# Patient Record
Sex: FEMALE | Race: WHITE | Employment: OTHER | URBAN - METROPOLITAN AREA
[De-identification: names, ages, dates, MRNs, and addresses within clinical notes are randomized per-mention and may not be internally consistent; named-entity substitution may affect disease eponyms.]

---

## 2023-04-20 PROBLEM — H04.123 DRY EYES, BILATERAL: Status: ACTIVE | Noted: 2023-04-20

## 2023-04-20 PROBLEM — B35.1 TINEA UNGUIUM: Status: ACTIVE | Noted: 2023-04-20

## 2023-04-20 PROBLEM — R68.84 PAIN IN LOWER JAW: Status: ACTIVE | Noted: 2023-04-20

## 2023-04-20 PROBLEM — M54.16 LUMBAR RADICULOPATHY: Status: ACTIVE | Noted: 2023-04-20

## 2023-04-20 PROBLEM — M54.32 SCIATICA OF LEFT SIDE: Status: ACTIVE | Noted: 2023-04-20

## 2023-04-20 PROBLEM — M25.561 RIGHT KNEE PAIN: Status: ACTIVE | Noted: 2023-04-20

## 2023-04-20 PROBLEM — E04.1 THYROID NODULE: Status: ACTIVE | Noted: 2023-04-20

## 2023-04-20 PROBLEM — J44.9 CHRONIC OBSTRUCTIVE PULMONARY DISEASE (MULTI): Status: ACTIVE | Noted: 2023-04-20

## 2023-04-20 PROBLEM — G47.33 OBSTRUCTIVE SLEEP APNEA: Status: ACTIVE | Noted: 2023-04-20

## 2023-04-20 PROBLEM — J38.7 VALLECULAR CYST: Status: ACTIVE | Noted: 2023-04-20

## 2023-04-20 PROBLEM — K11.8 PAIN OF SUBMANDIBULAR GLAND: Status: ACTIVE | Noted: 2023-04-20

## 2023-04-20 PROBLEM — F32.A MILD DEPRESSION: Status: ACTIVE | Noted: 2023-04-20

## 2023-04-20 PROBLEM — R26.9 GAIT DISTURBANCE: Status: ACTIVE | Noted: 2023-04-20

## 2023-04-20 PROBLEM — R09.82 POST-NASAL DRAINAGE: Status: ACTIVE | Noted: 2023-04-20

## 2023-04-20 PROBLEM — R09.A2 GLOBUS SENSATION: Status: ACTIVE | Noted: 2023-04-20

## 2023-04-20 PROBLEM — F40.240 CLAUSTROPHOBIA: Status: ACTIVE | Noted: 2023-04-20

## 2023-04-20 PROBLEM — H25.813 COMBINED FORM OF AGE-RELATED CATARACT, BOTH EYES: Status: ACTIVE | Noted: 2023-04-20

## 2023-04-20 PROBLEM — N18.31 CHRONIC KIDNEY DISEASE, STAGE 3A (MULTI): Status: ACTIVE | Noted: 2023-04-20

## 2023-04-20 PROBLEM — E55.9 VITAMIN D DEFICIENCY: Status: ACTIVE | Noted: 2023-04-20

## 2023-04-20 PROBLEM — K52.9 CHRONIC DIARRHEA: Status: ACTIVE | Noted: 2023-04-20

## 2023-04-20 PROBLEM — E78.5 HYPERLIPIDEMIA: Status: ACTIVE | Noted: 2023-04-20

## 2023-04-20 PROBLEM — E78.2 MIXED HYPERLIPIDEMIA: Status: ACTIVE | Noted: 2023-04-20

## 2023-04-20 PROBLEM — H52.00 HYPEROPIA: Status: ACTIVE | Noted: 2023-04-20

## 2023-04-20 PROBLEM — Z96.1 PSEUDOPHAKIA, LEFT EYE: Status: ACTIVE | Noted: 2023-04-20

## 2023-04-20 PROBLEM — L20.9 ATOPIC DERMATITIS: Status: ACTIVE | Noted: 2023-04-20

## 2023-04-20 PROBLEM — H52.209 ASTIGMATISM: Status: ACTIVE | Noted: 2023-04-20

## 2023-04-20 PROBLEM — L98.9 SKIN LESION: Status: ACTIVE | Noted: 2023-04-20

## 2023-04-20 PROBLEM — L71.9 ROSACEA: Status: ACTIVE | Noted: 2023-04-20

## 2023-04-20 PROBLEM — M54.50 LOW BACK PAIN: Status: ACTIVE | Noted: 2023-04-20

## 2023-04-20 PROBLEM — S01.81XA FACIAL LACERATION: Status: ACTIVE | Noted: 2023-04-20

## 2023-04-20 PROBLEM — Z96.1 PSEUDOPHAKIA OF RIGHT EYE: Status: ACTIVE | Noted: 2023-04-20

## 2023-04-20 PROBLEM — G50.0 TRIGEMINAL NEURALGIA OF RIGHT SIDE OF FACE: Status: ACTIVE | Noted: 2023-04-20

## 2023-04-20 PROBLEM — I10 BENIGN ESSENTIAL HYPERTENSION: Status: ACTIVE | Noted: 2023-04-20

## 2023-04-20 PROBLEM — M25.473 ANKLE EDEMA: Status: ACTIVE | Noted: 2023-04-20

## 2023-04-20 PROBLEM — E03.9 ACQUIRED HYPOTHYROIDISM: Status: ACTIVE | Noted: 2023-04-20

## 2023-04-20 PROBLEM — K13.79 MOUTH PAIN: Status: ACTIVE | Noted: 2023-04-20

## 2023-04-20 RX ORDER — FUROSEMIDE 20 MG/1
20 TABLET ORAL DAILY
COMMUNITY
Start: 2019-09-09 | End: 2023-04-21 | Stop reason: SDUPTHER

## 2023-04-20 RX ORDER — ROSUVASTATIN CALCIUM 10 MG/1
10 TABLET, COATED ORAL DAILY
COMMUNITY
Start: 2018-09-28 | End: 2023-04-21 | Stop reason: SDUPTHER

## 2023-04-20 RX ORDER — NAPROXEN 500 MG/1
500 TABLET ORAL DAILY PRN
COMMUNITY
Start: 2019-10-14 | End: 2023-04-21 | Stop reason: SDUPTHER

## 2023-04-20 RX ORDER — ESCITALOPRAM OXALATE 5 MG/1
5 TABLET ORAL DAILY
COMMUNITY
Start: 2022-03-14 | End: 2023-04-21 | Stop reason: SDUPTHER

## 2023-04-20 RX ORDER — BACLOFEN 10 MG/1
10 TABLET ORAL 2 TIMES DAILY
COMMUNITY
End: 2023-04-21 | Stop reason: ALTCHOICE

## 2023-04-20 RX ORDER — CALCIUM CARBONATE 200(500)MG
2 TABLET,CHEWABLE ORAL 4 TIMES DAILY PRN
COMMUNITY

## 2023-04-20 RX ORDER — MAGNESIUM 250 MG
TABLET ORAL DAILY
COMMUNITY
End: 2023-10-20 | Stop reason: ALTCHOICE

## 2023-04-20 RX ORDER — CIDER VINEGAR 300 MG
TABLET ORAL DAILY
COMMUNITY
End: 2023-07-21 | Stop reason: ALTCHOICE

## 2023-04-20 RX ORDER — GABAPENTIN 100 MG/1
100 CAPSULE ORAL 3 TIMES DAILY
COMMUNITY
Start: 2020-07-22 | End: 2023-04-21 | Stop reason: SDUPTHER

## 2023-04-20 RX ORDER — BIOTIN 10 MG
TABLET ORAL DAILY
COMMUNITY

## 2023-04-20 RX ORDER — CRISABOROLE 20 MG/G
OINTMENT TOPICAL DAILY
COMMUNITY
Start: 2019-09-09 | End: 2024-04-19 | Stop reason: SDUPTHER

## 2023-04-20 RX ORDER — ATENOLOL 50 MG/1
50 TABLET ORAL DAILY
COMMUNITY
Start: 2018-09-10 | End: 2023-04-21 | Stop reason: SDUPTHER

## 2023-04-20 RX ORDER — NAPROXEN SODIUM 220 MG
220 TABLET ORAL EVERY 12 HOURS PRN
COMMUNITY
Start: 2018-09-10 | End: 2023-04-21 | Stop reason: SDUPTHER

## 2023-04-21 ENCOUNTER — LAB (OUTPATIENT)
Dept: LAB | Facility: LAB | Age: 83
End: 2023-04-21
Payer: COMMERCIAL

## 2023-04-21 ENCOUNTER — OFFICE VISIT (OUTPATIENT)
Dept: PRIMARY CARE | Facility: CLINIC | Age: 83
End: 2023-04-21
Payer: COMMERCIAL

## 2023-04-21 VITALS
DIASTOLIC BLOOD PRESSURE: 77 MMHG | WEIGHT: 174 LBS | SYSTOLIC BLOOD PRESSURE: 135 MMHG | BODY MASS INDEX: 37 KG/M2 | HEART RATE: 67 BPM | TEMPERATURE: 97.3 F

## 2023-04-21 DIAGNOSIS — Z79.899 MEDICATION MANAGEMENT: Primary | ICD-10-CM

## 2023-04-21 DIAGNOSIS — M54.16 LUMBAR RADICULOPATHY: ICD-10-CM

## 2023-04-21 DIAGNOSIS — I10 BENIGN ESSENTIAL HYPERTENSION: ICD-10-CM

## 2023-04-21 DIAGNOSIS — Z00.00 ROUTINE GENERAL MEDICAL EXAMINATION AT HEALTH CARE FACILITY: ICD-10-CM

## 2023-04-21 DIAGNOSIS — E78.5 HYPERLIPIDEMIA, UNSPECIFIED HYPERLIPIDEMIA TYPE: ICD-10-CM

## 2023-04-21 DIAGNOSIS — R73.9 HYPERGLYCEMIA: ICD-10-CM

## 2023-04-21 DIAGNOSIS — F32.A MILD DEPRESSION: ICD-10-CM

## 2023-04-21 DIAGNOSIS — Z00.00 MEDICARE ANNUAL WELLNESS VISIT, SUBSEQUENT: ICD-10-CM

## 2023-04-21 LAB
ALANINE AMINOTRANSFERASE (SGPT) (U/L) IN SER/PLAS: 16 U/L (ref 7–45)
ALBUMIN (G/DL) IN SER/PLAS: 4.2 G/DL (ref 3.4–5)
ALKALINE PHOSPHATASE (U/L) IN SER/PLAS: 55 U/L (ref 33–136)
ANION GAP IN SER/PLAS: 13 MMOL/L (ref 10–20)
ASPARTATE AMINOTRANSFERASE (SGOT) (U/L) IN SER/PLAS: 17 U/L (ref 9–39)
BASOPHILS (10*3/UL) IN BLOOD BY AUTOMATED COUNT: 0.07 X10E9/L (ref 0–0.1)
BASOPHILS/100 LEUKOCYTES IN BLOOD BY AUTOMATED COUNT: 0.9 % (ref 0–2)
BILIRUBIN TOTAL (MG/DL) IN SER/PLAS: 0.7 MG/DL (ref 0–1.2)
CALCIUM (MG/DL) IN SER/PLAS: 10.1 MG/DL (ref 8.6–10.6)
CARBON DIOXIDE, TOTAL (MMOL/L) IN SER/PLAS: 29 MMOL/L (ref 21–32)
CHLORIDE (MMOL/L) IN SER/PLAS: 106 MMOL/L (ref 98–107)
CHOLESTEROL (MG/DL) IN SER/PLAS: 197 MG/DL (ref 0–199)
CHOLESTEROL IN HDL (MG/DL) IN SER/PLAS: 60.1 MG/DL
CHOLESTEROL/HDL RATIO: 3.3
CREATININE (MG/DL) IN SER/PLAS: 1.03 MG/DL (ref 0.5–1.05)
EOSINOPHILS (10*3/UL) IN BLOOD BY AUTOMATED COUNT: 0.1 X10E9/L (ref 0–0.4)
EOSINOPHILS/100 LEUKOCYTES IN BLOOD BY AUTOMATED COUNT: 1.4 % (ref 0–6)
ERYTHROCYTE DISTRIBUTION WIDTH (RATIO) BY AUTOMATED COUNT: 12.8 % (ref 11.5–14.5)
ERYTHROCYTE MEAN CORPUSCULAR HEMOGLOBIN CONCENTRATION (G/DL) BY AUTOMATED: 32.3 G/DL (ref 32–36)
ERYTHROCYTE MEAN CORPUSCULAR VOLUME (FL) BY AUTOMATED COUNT: 89 FL (ref 80–100)
ERYTHROCYTES (10*6/UL) IN BLOOD BY AUTOMATED COUNT: 4.55 X10E12/L (ref 4–5.2)
ESTIMATED AVERAGE GLUCOSE FOR HBA1C: 108 MG/DL
GFR FEMALE: 54 ML/MIN/1.73M2
GLUCOSE (MG/DL) IN SER/PLAS: 88 MG/DL (ref 74–99)
HEMATOCRIT (%) IN BLOOD BY AUTOMATED COUNT: 40.3 % (ref 36–46)
HEMOGLOBIN (G/DL) IN BLOOD: 13 G/DL (ref 12–16)
HEMOGLOBIN A1C/HEMOGLOBIN TOTAL IN BLOOD: 5.4 %
IMMATURE GRANULOCYTES/100 LEUKOCYTES IN BLOOD BY AUTOMATED COUNT: 0.3 % (ref 0–0.9)
LDL: 114 MG/DL (ref 0–99)
LEUKOCYTES (10*3/UL) IN BLOOD BY AUTOMATED COUNT: 7.4 X10E9/L (ref 4.4–11.3)
LYMPHOCYTES (10*3/UL) IN BLOOD BY AUTOMATED COUNT: 2.7 X10E9/L (ref 0.8–3)
LYMPHOCYTES/100 LEUKOCYTES IN BLOOD BY AUTOMATED COUNT: 36.6 % (ref 13–44)
MONOCYTES (10*3/UL) IN BLOOD BY AUTOMATED COUNT: 0.88 X10E9/L (ref 0.05–0.8)
MONOCYTES/100 LEUKOCYTES IN BLOOD BY AUTOMATED COUNT: 11.9 % (ref 2–10)
NEUTROPHILS (10*3/UL) IN BLOOD BY AUTOMATED COUNT: 3.6 X10E9/L (ref 1.6–5.5)
NEUTROPHILS/100 LEUKOCYTES IN BLOOD BY AUTOMATED COUNT: 48.9 % (ref 40–80)
NRBC (PER 100 WBCS) BY AUTOMATED COUNT: 0 /100 WBC (ref 0–0)
PLATELETS (10*3/UL) IN BLOOD AUTOMATED COUNT: 282 X10E9/L (ref 150–450)
POTASSIUM (MMOL/L) IN SER/PLAS: 4.5 MMOL/L (ref 3.5–5.3)
PROTEIN TOTAL: 6.9 G/DL (ref 6.4–8.2)
SODIUM (MMOL/L) IN SER/PLAS: 143 MMOL/L (ref 136–145)
TRIGLYCERIDE (MG/DL) IN SER/PLAS: 117 MG/DL (ref 0–149)
UREA NITROGEN (MG/DL) IN SER/PLAS: 22 MG/DL (ref 6–23)
VLDL: 23 MG/DL (ref 0–40)

## 2023-04-21 PROCEDURE — G0009 ADMIN PNEUMOCOCCAL VACCINE: HCPCS | Performed by: FAMILY MEDICINE

## 2023-04-21 PROCEDURE — 36415 COLL VENOUS BLD VENIPUNCTURE: CPT

## 2023-04-21 PROCEDURE — 1157F ADVNC CARE PLAN IN RCRD: CPT | Performed by: FAMILY MEDICINE

## 2023-04-21 PROCEDURE — 1170F FXNL STATUS ASSESSED: CPT | Performed by: FAMILY MEDICINE

## 2023-04-21 PROCEDURE — 80355 GABAPENTIN NON-BLOOD: CPT

## 2023-04-21 PROCEDURE — 85025 COMPLETE CBC W/AUTO DIFF WBC: CPT

## 2023-04-21 PROCEDURE — 90677 PCV20 VACCINE IM: CPT | Performed by: FAMILY MEDICINE

## 2023-04-21 PROCEDURE — 1036F TOBACCO NON-USER: CPT | Performed by: FAMILY MEDICINE

## 2023-04-21 PROCEDURE — 99214 OFFICE O/P EST MOD 30 MIN: CPT | Performed by: FAMILY MEDICINE

## 2023-04-21 PROCEDURE — 1126F AMNT PAIN NOTED NONE PRSNT: CPT | Performed by: FAMILY MEDICINE

## 2023-04-21 PROCEDURE — 3078F DIAST BP <80 MM HG: CPT | Performed by: FAMILY MEDICINE

## 2023-04-21 PROCEDURE — 80053 COMPREHEN METABOLIC PANEL: CPT

## 2023-04-21 PROCEDURE — 3075F SYST BP GE 130 - 139MM HG: CPT | Performed by: FAMILY MEDICINE

## 2023-04-21 PROCEDURE — 80061 LIPID PANEL: CPT

## 2023-04-21 PROCEDURE — G0439 PPPS, SUBSEQ VISIT: HCPCS | Performed by: FAMILY MEDICINE

## 2023-04-21 PROCEDURE — 1159F MED LIST DOCD IN RCRD: CPT | Performed by: FAMILY MEDICINE

## 2023-04-21 PROCEDURE — 83036 HEMOGLOBIN GLYCOSYLATED A1C: CPT

## 2023-04-21 PROCEDURE — 80307 DRUG TEST PRSMV CHEM ANLYZR: CPT

## 2023-04-21 PROCEDURE — 1160F RVW MEDS BY RX/DR IN RCRD: CPT | Performed by: FAMILY MEDICINE

## 2023-04-21 RX ORDER — ROSUVASTATIN CALCIUM 10 MG/1
10 TABLET, COATED ORAL DAILY
Qty: 90 TABLET | Refills: 0 | Status: SHIPPED | OUTPATIENT
Start: 2023-04-21 | End: 2023-07-21 | Stop reason: SDUPTHER

## 2023-04-21 RX ORDER — FUROSEMIDE 20 MG/1
20 TABLET ORAL DAILY
Qty: 90 TABLET | Refills: 0 | Status: SHIPPED | OUTPATIENT
Start: 2023-04-21 | End: 2023-07-21 | Stop reason: SDUPTHER

## 2023-04-21 RX ORDER — ESCITALOPRAM OXALATE 5 MG/1
5 TABLET ORAL DAILY
Qty: 90 TABLET | Refills: 0 | Status: SHIPPED | OUTPATIENT
Start: 2023-04-21 | End: 2023-07-21 | Stop reason: SDUPTHER

## 2023-04-21 RX ORDER — GABAPENTIN 100 MG/1
100 CAPSULE ORAL 3 TIMES DAILY
Qty: 90 CAPSULE | Refills: 2 | Status: SHIPPED | OUTPATIENT
Start: 2023-04-21 | End: 2023-07-21 | Stop reason: SDUPTHER

## 2023-04-21 RX ORDER — ATENOLOL 50 MG/1
50 TABLET ORAL DAILY
Qty: 90 TABLET | Refills: 0 | Status: SHIPPED | OUTPATIENT
Start: 2023-04-21 | End: 2023-07-21 | Stop reason: SDUPTHER

## 2023-04-21 RX ORDER — NAPROXEN 500 MG/1
500 TABLET ORAL DAILY PRN
Qty: 90 TABLET | Refills: 0 | Status: SHIPPED | OUTPATIENT
Start: 2023-04-21 | End: 2023-07-21 | Stop reason: SDUPTHER

## 2023-04-21 ASSESSMENT — LIFESTYLE VARIABLES
HOW MANY STANDARD DRINKS CONTAINING ALCOHOL DO YOU HAVE ON A TYPICAL DAY: 1 OR 2
AUDIT-C TOTAL SCORE: 5
HOW OFTEN DO YOU HAVE A DRINK CONTAINING ALCOHOL: 2-4 TIMES A MONTH
HOW OFTEN DO YOU HAVE SIX OR MORE DRINKS ON ONE OCCASION: WEEKLY
SKIP TO QUESTIONS 9-10: 0

## 2023-04-21 ASSESSMENT — ENCOUNTER SYMPTOMS
LOSS OF SENSATION IN FEET: 0
OCCASIONAL FEELINGS OF UNSTEADINESS: 0
DEPRESSION: 0

## 2023-04-21 ASSESSMENT — ACTIVITIES OF DAILY LIVING (ADL)
GROCERY_SHOPPING: INDEPENDENT
BATHING: INDEPENDENT
TAKING_MEDICATION: INDEPENDENT
DRESSING: INDEPENDENT
MANAGING_FINANCES: INDEPENDENT
DOING_HOUSEWORK: INDEPENDENT

## 2023-04-21 ASSESSMENT — PATIENT HEALTH QUESTIONNAIRE - PHQ9
2. FEELING DOWN, DEPRESSED OR HOPELESS: NOT AT ALL
1. LITTLE INTEREST OR PLEASURE IN DOING THINGS: NOT AT ALL
SUM OF ALL RESPONSES TO PHQ9 QUESTIONS 1 AND 2: 0

## 2023-04-21 NOTE — PROGRESS NOTES
Subjective   Reason for Visit: Thania Christian is an 83 y.o. female here for a Medicare Wellness visit.     Past Medical, Surgical, and Family History reviewed and updated in chart.    Reviewed all medications by prescribing practitioner or clinical pharmacist (such as prescriptions, OTCs, herbal therapies and supplements) and documented in the medical record.  Medicare Wellness Billing Compliance Satisfied    Review all medications by prescribing practitioner or clinical pharmacist (such as prescriptions, OTCs, herbal therapies and supplements) documented in the medical record    Past Medical, Surgical, and Family History reviewed and updated in chart    Tobacco Use Reviewed    Alcohol Use Reviewed    Illicit Drug Use Reviewed    PHQ2/9    Falls in Last Year Reviewed    Home Safety Risk Factors Reviewed    Cognitive Impairment Reviewed    Patient Self Assessment and Health Status    Current Diet Reviewed    Exercise Frequency    ADL - Hearing Impairment    ADL - Bathing    ADL - Dressing    ADL - Walks in Home    IADL - Managing Finances    IADL - Grocery Shopping    IADL - Taking Medications    IADL - Doing Housework      HPI    Patient Care Team:  Miladys Mcleod DO as PCP - General  Miladys Mcleod DO as PCP - [employer] Medicare Advantage PCP  Miladys Mcleod DO as PCP - [employer] Advantage PCP     Review of Systems    Objective   Vitals:  /77   Pulse 67   Temp 36.3 °C (97.3 °F)   Wt 78.9 kg (174 lb)   BMI 37.00 kg/m²       Physical Exam  Constitutional:       Appearance: Normal appearance.   Cardiovascular:      Rate and Rhythm: Normal rate and regular rhythm.      Pulses: Normal pulses.      Heart sounds: Normal heart sounds.   Pulmonary:      Effort: Pulmonary effort is normal.      Breath sounds: Normal breath sounds.   Abdominal:      General: Bowel sounds are normal.      Palpations: Abdomen is soft.   Musculoskeletal:         General: No  tenderness. Normal range of motion.      Cervical back: Normal range of motion and neck supple.   Skin:     General: Skin is warm and dry.   Neurological:      General: No focal deficit present.      Mental Status: She is alert and oriented to person, place, and time.   Psychiatric:         Mood and Affect: Mood normal.         Thought Content: Thought content normal.         Judgment: Judgment normal.         Assessment/Plan   Problem List Items Addressed This Visit          Nervous    Lumbar radiculopathy    Relevant Medications    gabapentin (Neurontin) 100 mg capsule    naproxen (Naprosyn) 500 mg tablet    Other Relevant Orders    Drug Screen, Urine With Reflex to Confirmation    Drug Screen, Urine With Reflex to Confirmation       Circulatory    Benign essential hypertension    Relevant Medications    rosuvastatin (Crestor) 10 mg tablet    furosemide (Lasix) 20 mg tablet    atenolol (Tenormin) 50 mg tablet    Other Relevant Orders    CBC and Auto Differential    Comprehensive Metabolic Panel       Other    Hyperlipidemia    Relevant Medications    rosuvastatin (Crestor) 10 mg tablet    Other Relevant Orders    Lipid Panel    Mild depression    Relevant Medications    escitalopram (Lexapro) 5 mg tablet     Other Visit Diagnoses       Medication management    -  Primary    Relevant Orders    Gabapentin,Urine    Drug Screen, Urine With Reflex to Confirmation    Drug Screen, Urine With Reflex to Confirmation    Drug Screen, Urine With Reflex to Confirmation    Hyperglycemia        Relevant Orders    Hemoglobin A1C    Routine general medical examination at health care facility

## 2023-04-21 NOTE — PATIENT INSTRUCTIONS
Health Maintenance  Provided order for annual labs with fasting. Will call with results.   Provided Prevnar 20 in office today.     Diarrhea  Advised to reduce magnesium from routine.    Hyperlipidemia  Continue on rosuvastatin 10 mg daily. Prescription sent to pharmacy.      Hypertension  Continue on Furosemide 20 mg and atenolol 50 mg daily. Prescription sent to pharmacy.     Mild Depression  Continue on Escitalopram 5 mg daily. Prescription sent to pharmacy.     Back Pain and Trigeminal neuralgia  Continue on Gabapentin 100 mg three times daily and Naproxen 220 mg daily as needed. Prescription sent to pharmacy.  OARRS checked. Risk and benefit ratio assessed. No Suspicious activity

## 2023-04-21 NOTE — PROGRESS NOTES
Medicare Wellness Billing Compliance Satisfied    Review all medications by prescribing practitioner or clinical pharmacist (such as prescriptions, OTCs, herbal therapies and supplements) documented in the medical record    Past Medical, Surgical, and Family History reviewed and updated in chart    Tobacco Use Reviewed    Alcohol Use Reviewed    Illicit Drug Use Reviewed    PHQ2/9    Falls in Last Year Reviewed    Home Safety Risk Factors Reviewed    Cognitive Impairment Reviewed    Patient Self Assessment and Health Status    Current Diet Reviewed    Exercise Frequency    ADL - Hearing Impairment    ADL - Bathing    ADL - Dressing    ADL - Walks in Home    IADL - Managing Finances    IADL - Grocery Shopping    IADL - Taking Medications    IADL - Doing Housework        HPI  She recently switched insurance providers.     She complains of diarrhea. She has been drinking more water. She takes magnesium and has been intermittently doing so for 4-5 years.     She continues on rosuvastatin, gabapentin, escitalopram, naproxen, atenolol and fureosemide, as they are controlling symptoms well. She does not voice any side effects.     Assessment and Plan  Health Maintenance  Provided order for annual labs with fasting. Will call with results.   Provided Prevnar 20 in office today.     Diarrhea  Advised to reduce magnesium from routine.    Hyperlipidemia  Continue on rosuvastatin 10 mg daily. Prescription sent to pharmacy.      Hypertension  Continue on Furosemide 20 mg and atenolol 50 mg daily. Prescription sent to pharmacy.     Mild Depression  Continue on Escitalopram 5 mg daily. Prescription sent to pharmacy.     Back Pain and Trigeminal neuralgia  Continue on Gabapentin 100 mg three times daily and Naproxen 220 mg daily as needed. Prescription sent to pharmacy.  OARRS checked. Risk and benefit ratio assessed. No Suspicious activity  Drug Screen and Contract completed today.    Scribe Attestation  By  signing my name below, I, Sarah Pruett Scribchata   attest that this documentation has been prepared under the direction and in the presence of Miladys Mcleod DO.

## 2023-04-23 LAB
AMPHETAMINE (PRESENCE) IN URINE BY SCREEN METHOD: NORMAL
BARBITURATES PRESENCE IN URINE BY SCREEN METHOD: NORMAL
BENZODIAZEPINE (PRESENCE) IN URINE BY SCREEN METHOD: NORMAL
CANNABINOIDS IN URINE BY SCREEN METHOD: NORMAL
COCAINE (PRESENCE) IN URINE BY SCREEN METHOD: NORMAL
DRUG SCREEN COMMENT URINE: NORMAL
FENTANYL URINE: NORMAL
METHADONE (PRESENCE) IN URINE BY SCREEN METHOD: NORMAL
OPIATES (PRESENCE) IN URINE BY SCREEN METHOD: NORMAL
OXYCODONE (PRESENCE) IN URINE BY SCREEN METHOD: NORMAL
PHENCYCLIDINE (PRESENCE) IN URINE BY SCREEN METHOD: NORMAL

## 2023-04-28 LAB — GABAPENTIN,URINE: 51.7 UG/ML

## 2023-05-03 NOTE — RESULT ENCOUNTER NOTE
Report to patient her blood count is normal.  Her chemistries are normal.  Her cholesterol is mildly elevated with LDL/bad cholesterol at 114 and ideally should be below 100.  Hemoglobin A1c is normal also.

## 2023-07-20 DIAGNOSIS — M54.16 LUMBAR RADICULOPATHY: ICD-10-CM

## 2023-07-20 DIAGNOSIS — E78.5 HYPERLIPIDEMIA, UNSPECIFIED HYPERLIPIDEMIA TYPE: ICD-10-CM

## 2023-07-20 DIAGNOSIS — I10 BENIGN ESSENTIAL HYPERTENSION: ICD-10-CM

## 2023-07-21 ENCOUNTER — OFFICE VISIT (OUTPATIENT)
Dept: PRIMARY CARE | Facility: CLINIC | Age: 83
End: 2023-07-21
Payer: COMMERCIAL

## 2023-07-21 VITALS
DIASTOLIC BLOOD PRESSURE: 81 MMHG | TEMPERATURE: 98 F | RESPIRATION RATE: 18 BRPM | BODY MASS INDEX: 35.88 KG/M2 | SYSTOLIC BLOOD PRESSURE: 148 MMHG | WEIGHT: 178 LBS | HEART RATE: 59 BPM | HEIGHT: 59 IN | OXYGEN SATURATION: 93 %

## 2023-07-21 DIAGNOSIS — E78.2 MIXED HYPERLIPIDEMIA: ICD-10-CM

## 2023-07-21 DIAGNOSIS — F32.A MILD DEPRESSION: ICD-10-CM

## 2023-07-21 DIAGNOSIS — M54.16 LUMBAR RADICULOPATHY: ICD-10-CM

## 2023-07-21 DIAGNOSIS — E83.42 HYPOMAGNESEMIA: ICD-10-CM

## 2023-07-21 DIAGNOSIS — J44.9 CHRONIC OBSTRUCTIVE PULMONARY DISEASE, UNSPECIFIED COPD TYPE (MULTI): ICD-10-CM

## 2023-07-21 DIAGNOSIS — R26.9 GAIT DISTURBANCE: ICD-10-CM

## 2023-07-21 DIAGNOSIS — N18.31 CHRONIC KIDNEY DISEASE, STAGE 3A (MULTI): ICD-10-CM

## 2023-07-21 DIAGNOSIS — R73.9 HYPERGLYCEMIA: ICD-10-CM

## 2023-07-21 DIAGNOSIS — I10 BENIGN ESSENTIAL HYPERTENSION: Primary | ICD-10-CM

## 2023-07-21 DIAGNOSIS — E66.01 OBESITY, MORBID (MULTI): ICD-10-CM

## 2023-07-21 DIAGNOSIS — E78.5 HYPERLIPIDEMIA, UNSPECIFIED HYPERLIPIDEMIA TYPE: ICD-10-CM

## 2023-07-21 DIAGNOSIS — E03.9 ACQUIRED HYPOTHYROIDISM: ICD-10-CM

## 2023-07-21 PROBLEM — R26.89 BALANCE PROBLEMS: Status: ACTIVE | Noted: 2023-07-21

## 2023-07-21 PROBLEM — M62.81 MUSCLE WEAKNESS: Status: ACTIVE | Noted: 2023-07-21

## 2023-07-21 PROCEDURE — 1126F AMNT PAIN NOTED NONE PRSNT: CPT | Performed by: FAMILY MEDICINE

## 2023-07-21 PROCEDURE — 3079F DIAST BP 80-89 MM HG: CPT | Performed by: FAMILY MEDICINE

## 2023-07-21 PROCEDURE — 1159F MED LIST DOCD IN RCRD: CPT | Performed by: FAMILY MEDICINE

## 2023-07-21 PROCEDURE — 1036F TOBACCO NON-USER: CPT | Performed by: FAMILY MEDICINE

## 2023-07-21 PROCEDURE — 99214 OFFICE O/P EST MOD 30 MIN: CPT | Performed by: FAMILY MEDICINE

## 2023-07-21 PROCEDURE — 3077F SYST BP >= 140 MM HG: CPT | Performed by: FAMILY MEDICINE

## 2023-07-21 PROCEDURE — 1160F RVW MEDS BY RX/DR IN RCRD: CPT | Performed by: FAMILY MEDICINE

## 2023-07-21 PROCEDURE — 1157F ADVNC CARE PLAN IN RCRD: CPT | Performed by: FAMILY MEDICINE

## 2023-07-21 RX ORDER — ATENOLOL 50 MG/1
50 TABLET ORAL 2 TIMES DAILY
Qty: 180 TABLET | Refills: 0 | Status: SHIPPED | OUTPATIENT
Start: 2023-07-21 | End: 2023-10-20 | Stop reason: SDUPTHER

## 2023-07-21 RX ORDER — NAPROXEN 500 MG/1
500 TABLET ORAL DAILY PRN
Qty: 90 TABLET | Refills: 0 | Status: SHIPPED | OUTPATIENT
Start: 2023-07-21 | End: 2023-10-20 | Stop reason: SDUPTHER

## 2023-07-21 RX ORDER — ROSUVASTATIN CALCIUM 10 MG/1
10 TABLET, COATED ORAL DAILY
Qty: 90 TABLET | Refills: 0 | Status: SHIPPED | OUTPATIENT
Start: 2023-07-21 | End: 2023-10-20 | Stop reason: SDUPTHER

## 2023-07-21 RX ORDER — LOSARTAN POTASSIUM 25 MG/1
25 TABLET ORAL DAILY
Qty: 90 TABLET | Refills: 0 | Status: SHIPPED | OUTPATIENT
Start: 2023-07-21 | End: 2023-10-20 | Stop reason: SDUPTHER

## 2023-07-21 RX ORDER — FUROSEMIDE 20 MG/1
20 TABLET ORAL DAILY
Qty: 90 TABLET | Refills: 0 | Status: SHIPPED | OUTPATIENT
Start: 2023-07-21 | End: 2023-10-20 | Stop reason: SDUPTHER

## 2023-07-21 RX ORDER — ESCITALOPRAM OXALATE 5 MG/1
5 TABLET ORAL DAILY
Qty: 90 TABLET | Refills: 0 | Status: SHIPPED | OUTPATIENT
Start: 2023-07-21 | End: 2023-10-20 | Stop reason: SDUPTHER

## 2023-07-21 RX ORDER — ATENOLOL 50 MG/1
50 TABLET ORAL DAILY
Qty: 90 TABLET | Refills: 0 | Status: SHIPPED | OUTPATIENT
Start: 2023-07-21 | End: 2023-07-21 | Stop reason: SDUPTHER

## 2023-07-21 RX ORDER — NAPROXEN SODIUM 220 MG
220 TABLET ORAL EVERY 12 HOURS PRN
COMMUNITY
Start: 2018-09-10 | End: 2023-07-21 | Stop reason: ALTCHOICE

## 2023-07-21 RX ORDER — GABAPENTIN 100 MG/1
100 CAPSULE ORAL 3 TIMES DAILY
Qty: 270 CAPSULE | Refills: 0 | Status: SHIPPED | OUTPATIENT
Start: 2023-07-21 | End: 2023-10-20 | Stop reason: SDUPTHER

## 2023-07-21 ASSESSMENT — PATIENT HEALTH QUESTIONNAIRE - PHQ9
1. LITTLE INTEREST OR PLEASURE IN DOING THINGS: NOT AT ALL
2. FEELING DOWN, DEPRESSED OR HOPELESS: NOT AT ALL
SUM OF ALL RESPONSES TO PHQ9 QUESTIONS 1 AND 2: 0

## 2023-07-21 ASSESSMENT — PAIN SCALES - GENERAL: PAINLEVEL: 0-NO PAIN

## 2023-07-21 NOTE — ASSESSMENT & PLAN NOTE
Pt has gained weight in new living condition.  Discussed diet and activity level, reassess 6 months

## 2023-07-21 NOTE — PROGRESS NOTES
"Subjective   Patient ID: Thania Christian is a 83 y.o. female who presents for 3 month f/up.    HPI   She continues on atenolol, escitalopram, furosemide, gabapentin, and rosuvastatin. She does not voice any side effects.     She takes Naproxen in the morning for relief. She at times takes an Aleve at night as needed but not daily     She reports that she has two disability placards that are up for renewal. She needs paperwork for renewal.     She inquired about her Magnesium level.  Discussed diarrhea last OV and advised Mg may cause diarrhea and is much better since stopped it.  Will check level w her next labs    She has noticed her blood pressure is higher today and attributes to weight gain. She notes that her weight has increased due to the food at her Senior Living facility. She feels fatigued, and needs to nap more often. She states she is going to start eating sweets only at [employer] on Sunday    Breathing has been good, no sob.  Has not been affected by wild fire smoke etc.  Review of Systems    Objective   /81   Pulse 59   Temp 36.7 °C (98 °F)   Resp 18   Ht 1.499 m (4' 11\")   Wt 80.7 kg (178 lb)   SpO2 93%   BMI 35.95 kg/m²     Physical Exam    Assessment/Plan     Health Maintenance  Provided order for 6 month labs with fasting. Will call with results.   Provided prescription for disability placard.     Hypertension  Continue on atenolol 50 mg twice daily and furosemide 20 mg daily. Prescription sent to pharmacy.   Start on losartan 25 mg daily. Prescription sent to pharmacy.     Anxiety  Continue on escitalopram 5 mg daily. Prescription sent to pharmacy.     Lumbar Radiculopathy  Continue on gabapentin 100 mg three time daily. Prescription sent to pharmacy  OARRS checked. Risk and benefit ratio assessed. No Suspicious activity.   Continue on Naproxen 500 mg daily. Prescription sent to pharmacy.     Follow up in 3 months, unless a visit is needed sooner     Problem List Items Addressed This Visit "       Acquired hypothyroidism    Relevant Orders    TSH with reflex to Free T4 if abnormal    Benign essential hypertension - Primary    Relevant Medications    furosemide (Lasix) 20 mg tablet    rosuvastatin (Crestor) 10 mg tablet    atenolol (Tenormin) 50 mg tablet    losartan (Cozaar) 25 mg tablet    Other Relevant Orders    Comprehensive Metabolic Panel    Chronic kidney disease, stage 3a     Stable.  Pt compliant w meds, continue treatment and reassess in 3 months         Chronic obstructive pulmonary disease (CMS/HCC)     Stable, asymptomatic.  Reassess q 6 months, no change in treatment         Gait disturbance    Relevant Orders    Disability Placard    Disability Placard    Mixed hyperlipidemia    Relevant Orders    Lipid Panel    Lumbar radiculopathy    Relevant Medications    gabapentin (Neurontin) 100 mg capsule    naproxen (Naprosyn) 500 mg tablet    Mild depression    Relevant Medications    escitalopram (Lexapro) 5 mg tablet    Hyperglycemia    Relevant Orders    Comprehensive Metabolic Panel    Hemoglobin A1C    Obesity, morbid (CMS/HCC)     Pt has gained weight in new living condition.  Discussed diet and activity level, reassess 6 months          Other Visit Diagnoses       Hyperlipidemia, unspecified hyperlipidemia type        Relevant Medications    rosuvastatin (Crestor) 10 mg tablet    Hypomagnesemia        Relevant Orders    Magnesium             Scribe Attestation  By signing my name below, ISarah Scribe   attest that this documentation has been prepared under the direction and in the presence of Miladys Mcleod DO.

## 2023-07-21 NOTE — PATIENT INSTRUCTIONS
Health Maintenance  Provided order for 6 month labs with fasting. Will call with results.   Provided prescription for disability placard.     Hypertension  Continue on atenolol 50 mg twice daily and furosemide 20 mg daily. Prescription sent to pharmacy.   Start on losartan 25 mg daily. Prescription sent to pharmacy.     Anxiety  Continue on escitalopram 5 mg daily. Prescription sent to pharmacy.     Lumbar Radiculopathy  Continue on gabapentin 100 mg three time daily. Prescription sent to pharmacy  OARRS checked. Risk and benefit ratio assessed. No Suspicious activity.   Continue on Naproxen 500 mg daily. Prescription sent to pharmacy.

## 2023-10-10 ENCOUNTER — LAB (OUTPATIENT)
Dept: LAB | Facility: LAB | Age: 83
End: 2023-10-10
Payer: COMMERCIAL

## 2023-10-10 DIAGNOSIS — E83.42 HYPOMAGNESEMIA: ICD-10-CM

## 2023-10-10 DIAGNOSIS — E78.2 MIXED HYPERLIPIDEMIA: ICD-10-CM

## 2023-10-10 DIAGNOSIS — I10 BENIGN ESSENTIAL HYPERTENSION: ICD-10-CM

## 2023-10-10 DIAGNOSIS — R73.9 HYPERGLYCEMIA: ICD-10-CM

## 2023-10-10 DIAGNOSIS — E03.9 ACQUIRED HYPOTHYROIDISM: ICD-10-CM

## 2023-10-10 LAB
ALBUMIN SERPL BCP-MCNC: 3.9 G/DL (ref 3.4–5)
ALP SERPL-CCNC: 56 U/L (ref 33–136)
ALT SERPL W P-5'-P-CCNC: 19 U/L (ref 7–45)
ANION GAP SERPL CALC-SCNC: 13 MMOL/L (ref 10–20)
AST SERPL W P-5'-P-CCNC: 15 U/L (ref 9–39)
BILIRUB SERPL-MCNC: 0.6 MG/DL (ref 0–1.2)
BUN SERPL-MCNC: 21 MG/DL (ref 6–23)
CALCIUM SERPL-MCNC: 9.6 MG/DL (ref 8.6–10.6)
CHLORIDE SERPL-SCNC: 106 MMOL/L (ref 98–107)
CHOLEST SERPL-MCNC: 190 MG/DL (ref 0–199)
CHOLESTEROL/HDL RATIO: 3.5
CO2 SERPL-SCNC: 27 MMOL/L (ref 21–32)
CREAT SERPL-MCNC: 0.94 MG/DL (ref 0.5–1.05)
EST. AVERAGE GLUCOSE BLD GHB EST-MCNC: 108 MG/DL
GFR SERPL CREATININE-BSD FRML MDRD: 60 ML/MIN/1.73M*2
GLUCOSE SERPL-MCNC: 104 MG/DL (ref 74–99)
HBA1C MFR BLD: 5.4 %
HDLC SERPL-MCNC: 55 MG/DL
LDLC SERPL CALC-MCNC: 113 MG/DL (ref 140–190)
MAGNESIUM SERPL-MCNC: 1.95 MG/DL (ref 1.6–2.4)
NON HDL CHOLESTEROL: 135 MG/DL (ref 0–149)
POTASSIUM SERPL-SCNC: 4.7 MMOL/L (ref 3.5–5.3)
PROT SERPL-MCNC: 6.7 G/DL (ref 6.4–8.2)
SODIUM SERPL-SCNC: 141 MMOL/L (ref 136–145)
TRIGL SERPL-MCNC: 112 MG/DL (ref 0–149)
TSH SERPL-ACNC: 2.99 MIU/L (ref 0.44–3.98)
VLDL: 22 MG/DL (ref 0–40)

## 2023-10-10 PROCEDURE — 83036 HEMOGLOBIN GLYCOSYLATED A1C: CPT

## 2023-10-10 PROCEDURE — 83735 ASSAY OF MAGNESIUM: CPT

## 2023-10-10 PROCEDURE — 84443 ASSAY THYROID STIM HORMONE: CPT

## 2023-10-10 PROCEDURE — 80053 COMPREHEN METABOLIC PANEL: CPT

## 2023-10-10 PROCEDURE — 36415 COLL VENOUS BLD VENIPUNCTURE: CPT

## 2023-10-10 PROCEDURE — 80061 LIPID PANEL: CPT

## 2023-10-18 RX ORDER — NAPROXEN 500 MG/1
500 TABLET ORAL DAILY PRN
Qty: 90 TABLET | Refills: 0 | OUTPATIENT
Start: 2023-10-18

## 2023-10-18 RX ORDER — ROSUVASTATIN CALCIUM 10 MG/1
10 TABLET, COATED ORAL DAILY
Qty: 90 TABLET | Refills: 0 | OUTPATIENT
Start: 2023-10-18

## 2023-10-18 RX ORDER — ATENOLOL 50 MG/1
50 TABLET ORAL DAILY
Qty: 90 TABLET | Refills: 0 | OUTPATIENT
Start: 2023-10-18

## 2023-10-20 ENCOUNTER — OFFICE VISIT (OUTPATIENT)
Dept: PRIMARY CARE | Facility: CLINIC | Age: 83
End: 2023-10-20
Payer: COMMERCIAL

## 2023-10-20 VITALS
BODY MASS INDEX: 36.73 KG/M2 | RESPIRATION RATE: 18 BRPM | SYSTOLIC BLOOD PRESSURE: 131 MMHG | WEIGHT: 182.2 LBS | OXYGEN SATURATION: 94 % | DIASTOLIC BLOOD PRESSURE: 82 MMHG | HEIGHT: 59 IN | HEART RATE: 62 BPM

## 2023-10-20 DIAGNOSIS — R53.1 WEAKNESS GENERALIZED: ICD-10-CM

## 2023-10-20 DIAGNOSIS — M54.16 LUMBAR RADICULOPATHY: ICD-10-CM

## 2023-10-20 DIAGNOSIS — R26.89 BALANCE DISORDER: ICD-10-CM

## 2023-10-20 DIAGNOSIS — F32.A MILD DEPRESSION: ICD-10-CM

## 2023-10-20 DIAGNOSIS — R73.9 HYPERGLYCEMIA: ICD-10-CM

## 2023-10-20 DIAGNOSIS — E78.5 HYPERLIPIDEMIA, UNSPECIFIED HYPERLIPIDEMIA TYPE: ICD-10-CM

## 2023-10-20 DIAGNOSIS — I10 BENIGN ESSENTIAL HYPERTENSION: Primary | ICD-10-CM

## 2023-10-20 DIAGNOSIS — E03.9 ACQUIRED HYPOTHYROIDISM: ICD-10-CM

## 2023-10-20 DIAGNOSIS — N18.31 CHRONIC KIDNEY DISEASE, STAGE 3A (MULTI): ICD-10-CM

## 2023-10-20 PROCEDURE — 1126F AMNT PAIN NOTED NONE PRSNT: CPT | Performed by: FAMILY MEDICINE

## 2023-10-20 PROCEDURE — 3075F SYST BP GE 130 - 139MM HG: CPT | Performed by: FAMILY MEDICINE

## 2023-10-20 PROCEDURE — 3079F DIAST BP 80-89 MM HG: CPT | Performed by: FAMILY MEDICINE

## 2023-10-20 PROCEDURE — 1036F TOBACCO NON-USER: CPT | Performed by: FAMILY MEDICINE

## 2023-10-20 PROCEDURE — 90662 IIV NO PRSV INCREASED AG IM: CPT | Performed by: FAMILY MEDICINE

## 2023-10-20 PROCEDURE — 99214 OFFICE O/P EST MOD 30 MIN: CPT | Performed by: FAMILY MEDICINE

## 2023-10-20 PROCEDURE — 1159F MED LIST DOCD IN RCRD: CPT | Performed by: FAMILY MEDICINE

## 2023-10-20 PROCEDURE — 1160F RVW MEDS BY RX/DR IN RCRD: CPT | Performed by: FAMILY MEDICINE

## 2023-10-20 PROCEDURE — G0008 ADMIN INFLUENZA VIRUS VAC: HCPCS | Performed by: FAMILY MEDICINE

## 2023-10-20 RX ORDER — GABAPENTIN 100 MG/1
100 CAPSULE ORAL 3 TIMES DAILY
Qty: 270 CAPSULE | Refills: 0 | Status: SHIPPED | OUTPATIENT
Start: 2023-10-20 | End: 2024-01-19 | Stop reason: SDUPTHER

## 2023-10-20 RX ORDER — LOSARTAN POTASSIUM 50 MG/1
50 TABLET ORAL DAILY
Qty: 90 TABLET | Refills: 1 | Status: SHIPPED | OUTPATIENT
Start: 2023-10-20 | End: 2024-01-19 | Stop reason: SDUPTHER

## 2023-10-20 RX ORDER — ATENOLOL 50 MG/1
50 TABLET ORAL 2 TIMES DAILY
Qty: 180 TABLET | Refills: 0 | Status: SHIPPED | OUTPATIENT
Start: 2023-10-20 | End: 2024-01-19 | Stop reason: SDUPTHER

## 2023-10-20 RX ORDER — NAPROXEN 500 MG/1
500 TABLET ORAL DAILY PRN
Qty: 90 TABLET | Refills: 0 | Status: SHIPPED | OUTPATIENT
Start: 2023-10-20 | End: 2024-01-19 | Stop reason: SDUPTHER

## 2023-10-20 RX ORDER — ROSUVASTATIN CALCIUM 10 MG/1
10 TABLET, COATED ORAL DAILY
Qty: 90 TABLET | Refills: 0 | Status: SHIPPED | OUTPATIENT
Start: 2023-10-20 | End: 2024-01-19 | Stop reason: SDUPTHER

## 2023-10-20 RX ORDER — ESCITALOPRAM OXALATE 5 MG/1
5 TABLET ORAL DAILY
Qty: 90 TABLET | Refills: 0 | Status: SHIPPED | OUTPATIENT
Start: 2023-10-20 | End: 2024-01-19 | Stop reason: SDUPTHER

## 2023-10-20 RX ORDER — FUROSEMIDE 20 MG/1
20 TABLET ORAL DAILY
Qty: 90 TABLET | Refills: 0 | Status: SHIPPED | OUTPATIENT
Start: 2023-10-20 | End: 2024-01-19 | Stop reason: SDUPTHER

## 2023-10-20 ASSESSMENT — ENCOUNTER SYMPTOMS
LOSS OF SENSATION IN FEET: 0
DEPRESSION: 0
OCCASIONAL FEELINGS OF UNSTEADINESS: 0

## 2023-10-20 ASSESSMENT — PATIENT HEALTH QUESTIONNAIRE - PHQ9
1. LITTLE INTEREST OR PLEASURE IN DOING THINGS: NOT AT ALL
SUM OF ALL RESPONSES TO PHQ9 QUESTIONS 1 AND 2: 0
2. FEELING DOWN, DEPRESSED OR HOPELESS: NOT AT ALL

## 2023-10-20 ASSESSMENT — COLUMBIA-SUICIDE SEVERITY RATING SCALE - C-SSRS
2. HAVE YOU ACTUALLY HAD ANY THOUGHTS OF KILLING YOURSELF?: NO
1. IN THE PAST MONTH, HAVE YOU WISHED YOU WERE DEAD OR WISHED YOU COULD GO TO SLEEP AND NOT WAKE UP?: NO
6. HAVE YOU EVER DONE ANYTHING, STARTED TO DO ANYTHING, OR PREPARED TO DO ANYTHING TO END YOUR LIFE?: NO

## 2023-10-20 NOTE — PROGRESS NOTES
"Subjective   Patient ID: Thania Christian is a 83 y.o. female who presents for Follow-up (Wants to know how to work blood pressure machine. ).    HPI   The patient presents to the clinic for a 3-month follow-up and medication refills. The patient has past medical history of hypertension.    The patient has been compliant with her medication and denies any side-effects to her current medication.    She is concerned about her elevated blood pressure and wants to ensure that she is taking her blood pressure measurements on her own machine correctly.    The patient mentions that she is frequently out of breath and states that she would like to work with a physical therapist. She states that she has been walking more as she recently moved into a new apartment.    Pt states is gaining weight, has gained 4 # every 3 months.  Eating a lot of carbs, she is willing to shift her diet    Review of Systems    Objective   /82 (BP Location: Right arm, Patient Position: Sitting, BP Cuff Size: Large adult)   Pulse 62   Resp 18   Ht 1.499 m (4' 11\")   Wt 82.6 kg (182 lb 3.2 oz)   SpO2 94%   BMI 36.80 kg/m²     Physical Exam  Neck:      Vascular: No carotid bruit.   Cardiovascular:      Rate and Rhythm: Normal rate and regular rhythm.      Pulses: Normal pulses.      Heart sounds: Normal heart sounds.   Pulmonary:      Effort: Pulmonary effort is normal.      Breath sounds: Normal breath sounds.   Musculoskeletal:      Cervical back: Normal range of motion.      Right lower leg: No edema.      Left lower leg: No edema.   Skin:     General: Skin is warm and dry.   Neurological:      Mental Status: She is alert and oriented to person, place, and time.   Psychiatric:         Mood and Affect: Mood normal.         Thought Content: Thought content normal.         Judgment: Judgment normal.         Assessment/Plan   Problem List Items Addressed This Visit             ICD-10-CM    Acquired hypothyroidism E03.9    Benign essential " hypertension - Primary I10    Relevant Medications    losartan (Cozaar) 50 mg tablet    atenolol (Tenormin) 50 mg tablet    furosemide (Lasix) 20 mg tablet    rosuvastatin (Crestor) 10 mg tablet    Chronic kidney disease, stage 3a (CMS/HCC) N18.31    Lumbar radiculopathy M54.16    Relevant Medications    gabapentin (Neurontin) 100 mg capsule    naproxen (Naprosyn) 500 mg tablet    Mild depression F32.A    Relevant Medications    escitalopram (Lexapro) 5 mg tablet    Hyperglycemia R73.9     Other Visit Diagnoses         Codes    Hyperlipidemia, unspecified hyperlipidemia type     E78.5    Relevant Medications    rosuvastatin (Crestor) 10 mg tablet    Balance disorder     R26.89    Relevant Orders    Referral to Physical Therapy    Weakness generalized     R53.1    Relevant Orders    Referral to Physical Therapy          The patient's labs were reviewed. Her results were mostly normal but she was informed that her LDL was slightly higher than normal range.    The patient was advised on the correct procedure to take her blood pressure measurements on her own machine.    The patient's medications (atenolol 50 mg, escitalopram 5 mg, furosemide 5 mg, gabapentin 100 mg, losartan 50 mg, naproxen 500 mg, rosuvastatin 10 mg) were refilled.    The patient inquires about taking naproxen 500 mg twice a day but was instead advised to continue taking naproxen 500 mg once a day. She was informed that she can take acetaminophen before bedtime.    The patient received a flu vaccine in the office today.     She was referred to a physical therapist for general strengthening upon her request.    The patient will follow-up in 3 months.  Labs 6 months    Scribe Attestation  By signing my name below, I, Alisson Michael , Teri   attest that this documentation has been prepared under the direction and in the presence of Miladys Mcleod DO.

## 2024-01-19 ENCOUNTER — OFFICE VISIT (OUTPATIENT)
Dept: PRIMARY CARE | Facility: CLINIC | Age: 84
End: 2024-01-19
Payer: COMMERCIAL

## 2024-01-19 VITALS
HEART RATE: 64 BPM | HEIGHT: 59 IN | SYSTOLIC BLOOD PRESSURE: 131 MMHG | BODY MASS INDEX: 36.49 KG/M2 | TEMPERATURE: 97.8 F | DIASTOLIC BLOOD PRESSURE: 80 MMHG | OXYGEN SATURATION: 94 % | RESPIRATION RATE: 20 BRPM | WEIGHT: 181 LBS

## 2024-01-19 DIAGNOSIS — R73.9 HYPERGLYCEMIA: ICD-10-CM

## 2024-01-19 DIAGNOSIS — M54.16 LUMBAR RADICULOPATHY: Primary | ICD-10-CM

## 2024-01-19 DIAGNOSIS — E03.9 ACQUIRED HYPOTHYROIDISM: ICD-10-CM

## 2024-01-19 DIAGNOSIS — J44.9 CHRONIC OBSTRUCTIVE PULMONARY DISEASE, UNSPECIFIED COPD TYPE (MULTI): ICD-10-CM

## 2024-01-19 DIAGNOSIS — E78.5 HYPERLIPIDEMIA, UNSPECIFIED HYPERLIPIDEMIA TYPE: ICD-10-CM

## 2024-01-19 DIAGNOSIS — R26.89 BALANCE PROBLEMS: ICD-10-CM

## 2024-01-19 DIAGNOSIS — E04.1 THYROID NODULE: ICD-10-CM

## 2024-01-19 DIAGNOSIS — N18.31 CHRONIC KIDNEY DISEASE, STAGE 3A (MULTI): ICD-10-CM

## 2024-01-19 DIAGNOSIS — E66.01 OBESITY, MORBID (MULTI): ICD-10-CM

## 2024-01-19 DIAGNOSIS — E78.2 MIXED HYPERLIPIDEMIA: ICD-10-CM

## 2024-01-19 DIAGNOSIS — F32.A MILD DEPRESSION: ICD-10-CM

## 2024-01-19 DIAGNOSIS — I10 BENIGN ESSENTIAL HYPERTENSION: ICD-10-CM

## 2024-01-19 DIAGNOSIS — Z12.11 COLON CANCER SCREENING: ICD-10-CM

## 2024-01-19 PROCEDURE — 1160F RVW MEDS BY RX/DR IN RCRD: CPT | Performed by: FAMILY MEDICINE

## 2024-01-19 PROCEDURE — 3079F DIAST BP 80-89 MM HG: CPT | Performed by: FAMILY MEDICINE

## 2024-01-19 PROCEDURE — 1036F TOBACCO NON-USER: CPT | Performed by: FAMILY MEDICINE

## 2024-01-19 PROCEDURE — 3075F SYST BP GE 130 - 139MM HG: CPT | Performed by: FAMILY MEDICINE

## 2024-01-19 PROCEDURE — 1159F MED LIST DOCD IN RCRD: CPT | Performed by: FAMILY MEDICINE

## 2024-01-19 PROCEDURE — 99214 OFFICE O/P EST MOD 30 MIN: CPT | Performed by: FAMILY MEDICINE

## 2024-01-19 PROCEDURE — 1126F AMNT PAIN NOTED NONE PRSNT: CPT | Performed by: FAMILY MEDICINE

## 2024-01-19 RX ORDER — ATENOLOL 50 MG/1
50 TABLET ORAL 2 TIMES DAILY
Qty: 180 TABLET | Refills: 0 | Status: SHIPPED | OUTPATIENT
Start: 2024-01-19 | End: 2024-04-19 | Stop reason: SDUPTHER

## 2024-01-19 RX ORDER — ESCITALOPRAM OXALATE 5 MG/1
5 TABLET ORAL DAILY
Qty: 90 TABLET | Refills: 0 | Status: SHIPPED | OUTPATIENT
Start: 2024-01-19 | End: 2024-04-19 | Stop reason: SDUPTHER

## 2024-01-19 RX ORDER — ROSUVASTATIN CALCIUM 10 MG/1
10 TABLET, COATED ORAL DAILY
Qty: 90 TABLET | Refills: 0 | Status: SHIPPED | OUTPATIENT
Start: 2024-01-19 | End: 2024-04-19 | Stop reason: SDUPTHER

## 2024-01-19 RX ORDER — GABAPENTIN 100 MG/1
100 CAPSULE ORAL 3 TIMES DAILY
Qty: 270 CAPSULE | Refills: 0 | Status: SHIPPED | OUTPATIENT
Start: 2024-01-19 | End: 2024-04-19 | Stop reason: SDUPTHER

## 2024-01-19 RX ORDER — NAPROXEN 500 MG/1
500 TABLET ORAL DAILY PRN
Qty: 90 TABLET | Refills: 0 | Status: SHIPPED | OUTPATIENT
Start: 2024-01-19 | End: 2024-04-19 | Stop reason: SDUPTHER

## 2024-01-19 RX ORDER — LOSARTAN POTASSIUM 50 MG/1
50 TABLET ORAL DAILY
Qty: 90 TABLET | Refills: 1 | Status: SHIPPED | OUTPATIENT
Start: 2024-01-19 | End: 2024-04-19 | Stop reason: SDUPTHER

## 2024-01-19 RX ORDER — FUROSEMIDE 20 MG/1
20 TABLET ORAL DAILY
Qty: 90 TABLET | Refills: 0 | Status: SHIPPED | OUTPATIENT
Start: 2024-01-19 | End: 2024-04-19 | Stop reason: SDUPTHER

## 2024-01-19 ASSESSMENT — PAIN SCALES - GENERAL: PAINLEVEL: 0-NO PAIN

## 2024-01-19 NOTE — ASSESSMENT & PLAN NOTE
Pt concerned about weight gain, has started decreasing portions and deserts and etoh.  Encouraged walking at her SR facility w her walker.  Reassess 6 months

## 2024-01-19 NOTE — PROGRESS NOTES
"Subjective   Patient ID: Thania Christian is a 83 y.o. female who presents for 3 month f/up.    HPI   The patient presents to the clinic for a 3-month follow-up. She has past medical history of hypertension, hyperlipidemia, hypothyroidism, vitamin D deficiency, chronic diarrhea, CKD, depression, lumbar radiculopathy, COPD, and JENNA.    She has been compliant with medication and denies any side-effects to current medication.    Bp is good range, no cp or sob.  Feels well    The patient reports weight gain (current weight: 181 lbs, up 3 lbs from July 2023). She notes that her clothes have become tighter recently. She reports low activity level. She reports that her facility [address] is conducive to walking, but she would rather sit and read. She has been mindful of her diet by utilizing portion control, less deserts and alcohol etc.    She inquires about having a Cologuard colon cancer screening. She also inquires about having another thyroid ultrasound as thyroid nodules were visualized on her previous thyroid ultrasound from February 2022. Will order and get her back to endo if changing.  Had bx in 2020 by Dr rodríguez, pt states who has left [facility]    Her most recent labs were done in October 2023 and will get prior to next OV    Review of Systems    Objective   /80   Pulse 64   Temp 36.6 °C (97.8 °F)   Resp 20   Ht 1.499 m (4' 11\")   Wt 82.1 kg (181 lb)   SpO2 94%   BMI 36.56 kg/m²     Physical Exam  Constitutional:       Appearance: Normal appearance.   Neck:      Vascular: No carotid bruit.   Cardiovascular:      Rate and Rhythm: Normal rate and regular rhythm.      Pulses: Normal pulses.      Heart sounds: Normal heart sounds.   Pulmonary:      Effort: Pulmonary effort is normal.      Breath sounds: Normal breath sounds.   Musculoskeletal:         General: Normal range of motion.      Cervical back: Normal range of motion.      Right lower leg: No edema.      Left lower leg: No edema.   Skin:     General: " Skin is warm and dry.   Neurological:      Mental Status: She is alert and oriented to person, place, and time.      Gait: Gait abnormal.      Comments: Using walker   Psychiatric:         Mood and Affect: Mood normal.         Thought Content: Thought content normal.         Judgment: Judgment normal.         Assessment/Plan   Problem List Items Addressed This Visit             ICD-10-CM    Acquired hypothyroidism E03.9    Benign essential hypertension I10    Relevant Medications    rosuvastatin (Crestor) 10 mg tablet    losartan (Cozaar) 50 mg tablet    furosemide (Lasix) 20 mg tablet    atenolol (Tenormin) 50 mg tablet    Other Relevant Orders    CBC and Auto Differential    Chronic kidney disease, stage 3a (CMS/HCC) N18.31     Asymptomatic and stable, recheck w labs in 3  months         Chronic obstructive pulmonary disease (CMS/HCC) J44.9     Asymptomatic, no sob or wheezing, pt feeling well.  Lungs clear on physical exam         Mixed hyperlipidemia E78.2    Lumbar radiculopathy - Primary M54.16    Relevant Medications    naproxen (Naprosyn) 500 mg tablet    gabapentin (Neurontin) 100 mg capsule    Mild depression F32.A    Relevant Medications    escitalopram (Lexapro) 5 mg tablet    Thyroid nodule E04.1    Relevant Orders    US thyroid    TSH with reflex to Free T4 if abnormal    Hyperglycemia R73.9    Relevant Orders    Hemoglobin A1C    Balance problems R26.89    Obesity, morbid (CMS/HCC) E66.01     Pt concerned about weight gain, has started decreasing portions and deserts and etoh.  Encouraged walking at her SR facility w her walker.  Reassess 6 months          Other Visit Diagnoses         Codes    Hyperlipidemia, unspecified hyperlipidemia type     E78.5    Relevant Medications    rosuvastatin (Crestor) 10 mg tablet    Other Relevant Orders    Comprehensive Metabolic Panel    Lipid Panel    Colon cancer screening     Z12.11    Relevant Orders    Cologuard® colon cancer screening               Repeat labs  (CBC, CMP, lipid panel, A1C, TSH) were ordered for the patient to complete  before her next visit in 3 months.     She received refills for current medication (atenolol 50 mg, escitalopram 5 mg, furosemide 20 mg, gabapentin 100 mg, losartan 50 mg, naproxen 500 mg, rosuvastatin 10 mg). She was instructed to continue taking medication as previously directed. In regards to her gabapentin prescription, OARRS were reviewed. Risk and benefit ratio was assessed. No suspicious activity was observed.    In accordance with her request, a Cologuard colon cancer screening was ordered for the patient. In addition, a thyroid ultrasound was ordered for the patient. The clinic will contact the patient upon receiving her results.  Will send to new ENT or endo if changes on scan    In regards to concerns with weight gain, weight loss strategies were discussed with the patient. She was advised to try incorporating additional exercise (walking) into her lifestyle as much as possible. Continue monitoring weight at home.    She will follow-up in 3 months, unless otherwise needed.    Scribe Attestation  By signing my name below, I, Teri Peace   attest that this documentation has been prepared under the direction and in the presence of Miladys Mcleod DO.

## 2024-02-15 LAB — NONINV COLON CA DNA+OCC BLD SCRN STL QL: POSITIVE

## 2024-02-16 DIAGNOSIS — Z12.11 COLON CANCER SCREENING: Primary | ICD-10-CM

## 2024-02-21 ENCOUNTER — TELEPHONE (OUTPATIENT)
Dept: PRIMARY CARE | Facility: CLINIC | Age: 84
End: 2024-02-21
Payer: COMMERCIAL

## 2024-02-21 NOTE — TELEPHONE ENCOUNTER
LVM FOR PATIENT WITH HER POSITIVE COLOGUARD RESULTS ALSO TOLD HER AN ORDER WAS IN HER CHART FOR A COLONOSCOPY AT [facility]

## 2024-02-21 NOTE — TELEPHONE ENCOUNTER
----- Message from Miladys Mcleod DO sent at 2/16/2024  3:13 PM EST -----  Report to pt her cologuard is positive and order for colonoscopy is in her chart for [facility].  If she wants to go elsewhere let me know and I will change it

## 2024-02-28 ENCOUNTER — HOSPITAL ENCOUNTER (OUTPATIENT)
Dept: RADIOLOGY | Facility: CLINIC | Age: 84
Discharge: HOME | End: 2024-02-28
Payer: COMMERCIAL

## 2024-02-28 DIAGNOSIS — E04.1 THYROID NODULE: ICD-10-CM

## 2024-02-28 PROCEDURE — 76536 US EXAM OF HEAD AND NECK: CPT

## 2024-02-28 PROCEDURE — 76536 US EXAM OF HEAD AND NECK: CPT | Performed by: RADIOLOGY

## 2024-04-09 ENCOUNTER — LAB (OUTPATIENT)
Dept: LAB | Facility: LAB | Age: 84
End: 2024-04-09
Payer: COMMERCIAL

## 2024-04-09 DIAGNOSIS — R73.9 HYPERGLYCEMIA: ICD-10-CM

## 2024-04-09 DIAGNOSIS — I10 BENIGN ESSENTIAL HYPERTENSION: ICD-10-CM

## 2024-04-09 DIAGNOSIS — E04.1 THYROID NODULE: ICD-10-CM

## 2024-04-09 DIAGNOSIS — E78.5 HYPERLIPIDEMIA, UNSPECIFIED HYPERLIPIDEMIA TYPE: ICD-10-CM

## 2024-04-09 LAB
ALBUMIN SERPL BCP-MCNC: 3.8 G/DL (ref 3.4–5)
ALP SERPL-CCNC: 49 U/L (ref 33–136)
ALT SERPL W P-5'-P-CCNC: 15 U/L (ref 7–45)
ANION GAP SERPL CALC-SCNC: 12 MMOL/L (ref 10–20)
AST SERPL W P-5'-P-CCNC: 14 U/L (ref 9–39)
BASOPHILS # BLD AUTO: 0.05 X10*3/UL (ref 0–0.1)
BASOPHILS NFR BLD AUTO: 0.7 %
BILIRUB SERPL-MCNC: 0.7 MG/DL (ref 0–1.2)
BUN SERPL-MCNC: 20 MG/DL (ref 6–23)
CALCIUM SERPL-MCNC: 9.6 MG/DL (ref 8.6–10.6)
CHLORIDE SERPL-SCNC: 105 MMOL/L (ref 98–107)
CHOLEST SERPL-MCNC: 177 MG/DL (ref 0–199)
CHOLESTEROL/HDL RATIO: 3.6
CO2 SERPL-SCNC: 28 MMOL/L (ref 21–32)
CREAT SERPL-MCNC: 0.85 MG/DL (ref 0.5–1.05)
EGFRCR SERPLBLD CKD-EPI 2021: 67 ML/MIN/1.73M*2
EOSINOPHIL # BLD AUTO: 0.13 X10*3/UL (ref 0–0.4)
EOSINOPHIL NFR BLD AUTO: 1.9 %
ERYTHROCYTE [DISTWIDTH] IN BLOOD BY AUTOMATED COUNT: 12.9 % (ref 11.5–14.5)
EST. AVERAGE GLUCOSE BLD GHB EST-MCNC: 105 MG/DL
GLUCOSE SERPL-MCNC: 112 MG/DL (ref 74–99)
HBA1C MFR BLD: 5.3 %
HCT VFR BLD AUTO: 37.4 % (ref 36–46)
HDLC SERPL-MCNC: 48.9 MG/DL
HGB BLD-MCNC: 12.5 G/DL (ref 12–16)
IMM GRANULOCYTES # BLD AUTO: 0.02 X10*3/UL (ref 0–0.5)
IMM GRANULOCYTES NFR BLD AUTO: 0.3 % (ref 0–0.9)
LDLC SERPL CALC-MCNC: 101 MG/DL
LYMPHOCYTES # BLD AUTO: 2.46 X10*3/UL (ref 0.8–3)
LYMPHOCYTES NFR BLD AUTO: 36.1 %
MCH RBC QN AUTO: 28.8 PG (ref 26–34)
MCHC RBC AUTO-ENTMCNC: 33.4 G/DL (ref 32–36)
MCV RBC AUTO: 86 FL (ref 80–100)
MONOCYTES # BLD AUTO: 0.87 X10*3/UL (ref 0.05–0.8)
MONOCYTES NFR BLD AUTO: 12.8 %
NEUTROPHILS # BLD AUTO: 3.29 X10*3/UL (ref 1.6–5.5)
NEUTROPHILS NFR BLD AUTO: 48.2 %
NON HDL CHOLESTEROL: 128 MG/DL (ref 0–149)
NRBC BLD-RTO: 0 /100 WBCS (ref 0–0)
PLATELET # BLD AUTO: 246 X10*3/UL (ref 150–450)
POTASSIUM SERPL-SCNC: 4.3 MMOL/L (ref 3.5–5.3)
PROT SERPL-MCNC: 6.5 G/DL (ref 6.4–8.2)
RBC # BLD AUTO: 4.34 X10*6/UL (ref 4–5.2)
SODIUM SERPL-SCNC: 141 MMOL/L (ref 136–145)
TRIGL SERPL-MCNC: 134 MG/DL (ref 0–149)
TSH SERPL-ACNC: 3.39 MIU/L (ref 0.44–3.98)
VLDL: 27 MG/DL (ref 0–40)
WBC # BLD AUTO: 6.8 X10*3/UL (ref 4.4–11.3)

## 2024-04-09 PROCEDURE — 84443 ASSAY THYROID STIM HORMONE: CPT

## 2024-04-09 PROCEDURE — 80061 LIPID PANEL: CPT

## 2024-04-09 PROCEDURE — 83036 HEMOGLOBIN GLYCOSYLATED A1C: CPT

## 2024-04-09 PROCEDURE — 85025 COMPLETE CBC W/AUTO DIFF WBC: CPT

## 2024-04-09 PROCEDURE — 80053 COMPREHEN METABOLIC PANEL: CPT

## 2024-04-09 PROCEDURE — 36415 COLL VENOUS BLD VENIPUNCTURE: CPT

## 2024-04-19 ENCOUNTER — OFFICE VISIT (OUTPATIENT)
Dept: PRIMARY CARE | Facility: CLINIC | Age: 84
End: 2024-04-19
Payer: COMMERCIAL

## 2024-04-19 VITALS
HEIGHT: 59 IN | TEMPERATURE: 97.7 F | SYSTOLIC BLOOD PRESSURE: 139 MMHG | HEART RATE: 70 BPM | DIASTOLIC BLOOD PRESSURE: 73 MMHG | OXYGEN SATURATION: 91 % | BODY MASS INDEX: 36.89 KG/M2 | WEIGHT: 183 LBS | RESPIRATION RATE: 18 BRPM

## 2024-04-19 DIAGNOSIS — M54.16 LUMBAR RADICULOPATHY: ICD-10-CM

## 2024-04-19 DIAGNOSIS — L40.9 PSORIASIS: ICD-10-CM

## 2024-04-19 DIAGNOSIS — F32.A MILD DEPRESSION: ICD-10-CM

## 2024-04-19 DIAGNOSIS — R19.7 DIARRHEA, UNSPECIFIED TYPE: ICD-10-CM

## 2024-04-19 DIAGNOSIS — L20.82 FLEXURAL ECZEMA: ICD-10-CM

## 2024-04-19 DIAGNOSIS — Z00.00 MEDICARE ANNUAL WELLNESS VISIT, SUBSEQUENT: Primary | ICD-10-CM

## 2024-04-19 DIAGNOSIS — Z12.11 COLON CANCER SCREENING: ICD-10-CM

## 2024-04-19 DIAGNOSIS — E04.1 THYROID NODULE: ICD-10-CM

## 2024-04-19 DIAGNOSIS — K52.9 CHRONIC DIARRHEA: ICD-10-CM

## 2024-04-19 DIAGNOSIS — I10 BENIGN ESSENTIAL HYPERTENSION: ICD-10-CM

## 2024-04-19 DIAGNOSIS — E78.5 HYPERLIPIDEMIA, UNSPECIFIED HYPERLIPIDEMIA TYPE: ICD-10-CM

## 2024-04-19 DIAGNOSIS — R73.9 HYPERGLYCEMIA: ICD-10-CM

## 2024-04-19 DIAGNOSIS — R19.5 POSITIVE COLORECTAL CANCER SCREENING USING COLOGUARD TEST: ICD-10-CM

## 2024-04-19 PROCEDURE — G0439 PPPS, SUBSEQ VISIT: HCPCS | Performed by: FAMILY MEDICINE

## 2024-04-19 PROCEDURE — 1170F FXNL STATUS ASSESSED: CPT | Performed by: FAMILY MEDICINE

## 2024-04-19 PROCEDURE — 1123F ACP DISCUSS/DSCN MKR DOCD: CPT | Performed by: FAMILY MEDICINE

## 2024-04-19 PROCEDURE — 1159F MED LIST DOCD IN RCRD: CPT | Performed by: FAMILY MEDICINE

## 2024-04-19 PROCEDURE — 3075F SYST BP GE 130 - 139MM HG: CPT | Performed by: FAMILY MEDICINE

## 2024-04-19 PROCEDURE — 1126F AMNT PAIN NOTED NONE PRSNT: CPT | Performed by: FAMILY MEDICINE

## 2024-04-19 PROCEDURE — 99214 OFFICE O/P EST MOD 30 MIN: CPT | Performed by: FAMILY MEDICINE

## 2024-04-19 PROCEDURE — 1036F TOBACCO NON-USER: CPT | Performed by: FAMILY MEDICINE

## 2024-04-19 PROCEDURE — 3078F DIAST BP <80 MM HG: CPT | Performed by: FAMILY MEDICINE

## 2024-04-19 PROCEDURE — 1160F RVW MEDS BY RX/DR IN RCRD: CPT | Performed by: FAMILY MEDICINE

## 2024-04-19 PROCEDURE — 1157F ADVNC CARE PLAN IN RCRD: CPT | Performed by: FAMILY MEDICINE

## 2024-04-19 RX ORDER — ATENOLOL 50 MG/1
50 TABLET ORAL 2 TIMES DAILY
Qty: 180 TABLET | Refills: 0 | Status: SHIPPED | OUTPATIENT
Start: 2024-04-19 | End: 2024-07-18

## 2024-04-19 RX ORDER — GABAPENTIN 100 MG/1
100 CAPSULE ORAL 3 TIMES DAILY
Qty: 270 CAPSULE | Refills: 0 | Status: SHIPPED | OUTPATIENT
Start: 2024-04-19 | End: 2024-07-18

## 2024-04-19 RX ORDER — CRISABOROLE 20 MG/G
OINTMENT TOPICAL
Qty: 100 G | Refills: 0 | Status: SHIPPED | OUTPATIENT
Start: 2024-04-19

## 2024-04-19 RX ORDER — LOSARTAN POTASSIUM 50 MG/1
50 TABLET ORAL DAILY
Qty: 90 TABLET | Refills: 1 | Status: SHIPPED | OUTPATIENT
Start: 2024-04-19 | End: 2024-10-16

## 2024-04-19 RX ORDER — NAPROXEN 500 MG/1
500 TABLET ORAL DAILY PRN
Qty: 90 TABLET | Refills: 0 | Status: SHIPPED | OUTPATIENT
Start: 2024-04-19 | End: 2024-07-18

## 2024-04-19 RX ORDER — FUROSEMIDE 20 MG/1
20 TABLET ORAL DAILY
Qty: 90 TABLET | Refills: 0 | Status: SHIPPED | OUTPATIENT
Start: 2024-04-19 | End: 2024-07-18

## 2024-04-19 RX ORDER — ESCITALOPRAM OXALATE 10 MG/1
10 TABLET ORAL DAILY
Qty: 90 TABLET | Refills: 0 | Status: SHIPPED | OUTPATIENT
Start: 2024-04-19 | End: 2024-07-18

## 2024-04-19 RX ORDER — ROSUVASTATIN CALCIUM 10 MG/1
10 TABLET, COATED ORAL DAILY
Qty: 90 TABLET | Refills: 0 | Status: SHIPPED | OUTPATIENT
Start: 2024-04-19 | End: 2024-07-18

## 2024-04-19 ASSESSMENT — PATIENT HEALTH QUESTIONNAIRE - PHQ9
SUM OF ALL RESPONSES TO PHQ9 QUESTIONS 1 AND 2: 1
8. MOVING OR SPEAKING SO SLOWLY THAT OTHER PEOPLE COULD HAVE NOTICED. OR THE OPPOSITE, BEING SO FIGETY OR RESTLESS THAT YOU HAVE BEEN MOVING AROUND A LOT MORE THAN USUAL: NOT AT ALL
3. TROUBLE FALLING OR STAYING ASLEEP OR SLEEPING TOO MUCH: SEVERAL DAYS
10. IF YOU CHECKED OFF ANY PROBLEMS, HOW DIFFICULT HAVE THESE PROBLEMS MADE IT FOR YOU TO DO YOUR WORK, TAKE CARE OF THINGS AT HOME, OR GET ALONG WITH OTHER PEOPLE: NOT DIFFICULT AT ALL
9. THOUGHTS THAT YOU WOULD BE BETTER OFF DEAD, OR OF HURTING YOURSELF: NOT AT ALL
SUM OF ALL RESPONSES TO PHQ QUESTIONS 1-9: 3
2. FEELING DOWN, DEPRESSED OR HOPELESS: NOT AT ALL
7. TROUBLE CONCENTRATING ON THINGS, SUCH AS READING THE NEWSPAPER OR WATCHING TELEVISION: NOT AT ALL
6. FEELING BAD ABOUT YOURSELF - OR THAT YOU ARE A FAILURE OR HAVE LET YOURSELF OR YOUR FAMILY DOWN: NOT AT ALL
1. LITTLE INTEREST OR PLEASURE IN DOING THINGS: SEVERAL DAYS
4. FEELING TIRED OR HAVING LITTLE ENERGY: SEVERAL DAYS
5. POOR APPETITE OR OVEREATING: NOT AT ALL

## 2024-04-19 ASSESSMENT — ANXIETY QUESTIONNAIRES
GAD7 TOTAL SCORE: 1
6. BECOMING EASILY ANNOYED OR IRRITABLE: NOT AT ALL
5. BEING SO RESTLESS THAT IT IS HARD TO SIT STILL: NOT AT ALL
4. TROUBLE RELAXING: NOT AT ALL
IF YOU CHECKED OFF ANY PROBLEMS ON THIS QUESTIONNAIRE, HOW DIFFICULT HAVE THESE PROBLEMS MADE IT FOR YOU TO DO YOUR WORK, TAKE CARE OF THINGS AT HOME, OR GET ALONG WITH OTHER PEOPLE: NOT DIFFICULT AT ALL
7. FEELING AFRAID AS IF SOMETHING AWFUL MIGHT HAPPEN: NOT AT ALL
1. FEELING NERVOUS, ANXIOUS, OR ON EDGE: SEVERAL DAYS
3. WORRYING TOO MUCH ABOUT DIFFERENT THINGS: NOT AT ALL
2. NOT BEING ABLE TO STOP OR CONTROL WORRYING: NOT AT ALL

## 2024-04-19 ASSESSMENT — ENCOUNTER SYMPTOMS
LOSS OF SENSATION IN FEET: 1
DEPRESSION: 1
OCCASIONAL FEELINGS OF UNSTEADINESS: 1

## 2024-04-19 ASSESSMENT — PAIN SCALES - GENERAL: PAINLEVEL: 0-NO PAIN

## 2024-04-19 ASSESSMENT — ACTIVITIES OF DAILY LIVING (ADL)
DRESSING: INDEPENDENT
DOING_HOUSEWORK: INDEPENDENT
MANAGING_FINANCES: INDEPENDENT
TAKING_MEDICATION: INDEPENDENT
GROCERY_SHOPPING: INDEPENDENT
BATHING: INDEPENDENT

## 2024-04-19 NOTE — PROGRESS NOTES
Subjective   Reason for Visit: Thania Christian is an 84 y.o. female here for a Medicare Wellness visit.     Past Medical, Surgical, and Family History reviewed and updated in chart.    Reviewed all medications by prescribing practitioner or clinical pharmacist (such as prescriptions, OTCs, herbal therapies and supplements) and documented in the medical record.  Medicare Wellness Billing Compliance Satisfied    *This is a visual tool to show completion of required items on the day of the visit. Green checks will only appear on the date of visit.    Review all medications by prescribing practitioner or clinical pharmacist (such as prescriptions, OTCs, herbal therapies and supplements) documented in the medical record    Past Medical, Surgical, and Family History reviewed and updated in chart    Tobacco Use Reviewed    Alcohol Use Reviewed    Illicit Drug Use Reviewed    PHQ2/9    Falls in Last Year Reviewed    Home Safety Risk Factors Reviewed    Cognitive Impairment Reviewed    Patient Self Assessment and Health Status    Current Diet Reviewed    Exercise Frequency    ADL - Hearing Impairment    ADL - Bathing    ADL - Dressing    ADL - Walks in Home    IADL - Managing Finances    IADL - Grocery Shopping    IADL - Taking Medications    IADL - Doing Housework      HPI  The patient presents to the clinic for an annual medicare wellness visit. She has past medical history of hypertension, hyperlipidemia, hypothyroidism, depression, ankle edema, lumbar radiculopathy, COPD, atopic dermatitis, and JENNA.    She has been compliant with medication and denies any major side-effects to current medication.    The patient had labs done on 04/09/2024 and requests to have them reviewed.  BS is 112 and hgba1c is improved at 5.3    The patient recalls that she had a Cologuard screening in early February 2024. Her Cologuard screening was positive (abnormal). Consequently, she is scheduled for a colonoscopy screening on  07/08/2024. The patient continues to struggle with diarrhea symptoms (almost daily). She states that she has increased her fiber intake and cut out coffee from her diet to improve her symptoms. She has not consulted with a GI specialist for treatment of chronic diarrhea thus far.  Had gotten better and now worse again    The patient recalls that she had a thyroid ultrasound on 02/28/2024 (follow-up for previous thyroid nodules) and she requests for her results to be reviewed.   She has a moderately suspicious nodule and needs to see endo.  She used to see Dr Hernandez who did bx in the past but she is no longer available.    The patient reports that her depression symptoms are fairly controlled but she thinks that her mood can be better. She states that she has not been participating in her above-50 community group nearly as much as before. She continues on Lexapro 5 mg for treatment of depression. She denies any side-effects to Lexapro 5 mg medication.  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 She intends to go back to [facility] for treatment of this condition.  She would like Eucrissa refilled for eczema that she sometimes gets on her arms    Her blood pressure (139/73) was slightly elevated when checked in the clinic today. She continues on losartan 50 mg (once daily) and atenolol 50 mg (2 times daily) for treatment of hypertension. She denies any side-effects to her losartan and/or atenolol medications.    She continues on rosuvastatin 10 mg daily for treatment of hyperlipidemia. She is tolerating this dose of rosuvastatin medication well. She denies any side-effects to her rosuvastatin medication.    Patient Care Team:  Miladys Mcleod DO as PCP - General  Miladys Mcleod DO as PCP - [facility] [employer] PCP     Review of Systems    Objective   Vitals:  /73   Pulse 70    "Temp 36.5 °C (97.7 °F)   Resp 18   Ht 1.499 m (4' 11\")   Wt 83 kg (183 lb)   SpO2 91%   BMI 36.96 kg/m²       Physical Exam  Neck:      Vascular: No carotid bruit.   Cardiovascular:      Rate and Rhythm: Normal rate and regular rhythm.      Pulses: Normal pulses.      Heart sounds: Normal heart sounds.   Pulmonary:      Effort: Pulmonary effort is normal.      Breath sounds: Normal breath sounds.   Musculoskeletal:         General: Normal range of motion.      Cervical back: Normal range of motion.      Right lower leg: No edema.      Left lower leg: No edema.   Skin:     General: Skin is warm and dry.   Neurological:      Mental Status: She is alert and oriented to person, place, and time.   Psychiatric:         Mood and Affect: Mood normal.         Thought Content: Thought content normal.         Judgment: Judgment normal.         Assessment/Plan   Problem List Items Addressed This Visit       Benign essential hypertension    Relevant Medications    rosuvastatin (Crestor) 10 mg tablet    losartan (Cozaar) 50 mg tablet    furosemide (Lasix) 20 mg tablet    atenolol (Tenormin) 50 mg tablet    Chronic diarrhea    Lumbar radiculopathy    Relevant Medications    naproxen (Naprosyn) 500 mg tablet    gabapentin (Neurontin) 100 mg capsule    Mild depression    Relevant Medications    escitalopram (Lexapro) 10 mg tablet    Thyroid nodule    Relevant Orders    Referral to Endocrinology    Medicare annual wellness visit, subsequent - Primary    Hyperglycemia     Other Visit Diagnoses       Hyperlipidemia, unspecified hyperlipidemia type        Relevant Medications    rosuvastatin (Crestor) 10 mg tablet    Flexural eczema        Relevant Medications    crisaborole (Eucrisa) 2 % ointment    Psoriasis        Relevant Orders    Referral to Dermatology    Diarrhea, unspecified type        Relevant Orders    Referral to Gastroenterology    Positive colorectal cancer screening using Cologuard test        Relevant Orders    " Referral to Gastroenterology    Colon cancer screening        Relevant Orders    Referral to Gastroenterology                 The patient's labs (04/09/2024) were reviewed. Blood count was mostly WNL, but monocytes (0.87) were slightly elevated. Chemistries were mostly WNL, but FBS (112) was elevated. Kidney function markers and liver enzymes were elevated. Her hemoglobin A1C was 5.3 % (normal range). Cholesterol results were mostly WNL (total cholesterol 177/HDL 48.9/triglycerides 134), but LDL (101) was slightly elevated. Her TSH (3.39) was within normal range. She was instructed to continue eating a healthy diet with regular exercise.    The patient received refills for current medication (atenolol 50 mg, crisaborole 2 %, furosemide 20 mg, gabapentin 100 mg, losartan 50 mg, naproxen 500 mg, rosuvastatin 10 mg). She was instructed to continue taking medication as previously directed. In regards to her gabapentin prescription, OARRS were reviewed. Risk and benefit ratio was assessed. No suspicious activity was observed. Her most recent UDS/CSA was done on 04/21/2023.    The patient's thyroid ultrasound from 02/28/2024 was reviewed. The patient was informed that imaging results showed a moderately suspicious nodule. Consequently, she received a referral to endocrinology for further evaluation. The endocrinologist will decide whether the patient requires a biopsy for additional evaluation. Continue monitoring symptoms for improvement/exacerbation.  She had previous bx [facility] Dr [facility]    In regards to concerns with chronic diarrhea, the patient received a referral to gastroenterology for further evaluation for diarrhea. She is  scheduled for colonoscopy in July 2024 for positive cologuard at [facility].  Told not to cancel yet but may end up with colonosocpy with whoever sees her for diarrhea for continuity and then can cancel the one scheduled if so.    In regards to concerns with rebounding depression symptoms, the  patient received a dose adjustment to her Lexapro medication (from 5 mg to 10 mg daily). Continue monitoring for potential side-effects to this dose of Lexapro medication. Continue monitoring symptoms for improvement/exacerbation.    She will follow-up in 3 months, unless otherwise needed.    Scribe Attestation  By signing my name below, I, Alisson Michael , Scrbrian   attest that this documentation has been prepared under the direction and in the presence of Miladys Mcleod DO.

## 2024-04-30 ASSESSMENT — EXTERNAL EXAM - LEFT EYE: OS_EXAM: NORMAL

## 2024-04-30 ASSESSMENT — SLIT LAMP EXAM - LIDS
COMMENTS: GOOD POSITION, DERMATOCHALASIS
COMMENTS: GOOD POSITION, DERMATOCHALASIS

## 2024-04-30 ASSESSMENT — EXTERNAL EXAM - RIGHT EYE: OD_EXAM: NORMAL

## 2024-04-30 NOTE — PROGRESS NOTES
Dry eyes, [id number]  -Continue artificial tears 2-4 times a day.   -Start warm compresses OU BID PRN (helps with blocked NLD)  -Will avoid ointment because patient not sleeping much and would like to avoid blurry vision which may cause falls (patient had prior fall requiring brow stitches and minor concussion).  -Minor allergic component with itching, may consider OTC antihistamine gtts as needed (advised to avoid redness relief drops).  -F/u 1 year for comprehensive exam, sooner if symptoms worsen.     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 Good vision. IOP good. Off all gtts.     SbykqunoxV74.00  UdovqhpalzmM07.209  -Per patient, she is happy with her uncorrected distance and reading vision. She feels she can see tablet without reading glasses. Uses reading glasses PRN for very small print. May use +2.50 OTC glasses.   -New Rx given per patient request, optional to fill.         No FH of AMD/glaucoma

## 2024-05-01 ENCOUNTER — OFFICE VISIT (OUTPATIENT)
Dept: OPHTHALMOLOGY | Facility: CLINIC | Age: 84
End: 2024-05-01
Payer: COMMERCIAL

## 2024-05-01 DIAGNOSIS — H52.03 HYPEROPIA, BILATERAL: ICD-10-CM

## 2024-05-01 DIAGNOSIS — H04.123 DRY EYES, BILATERAL: Primary | ICD-10-CM

## 2024-05-01 DIAGNOSIS — H52.203 ASTIGMATISM OF BOTH EYES, UNSPECIFIED TYPE: ICD-10-CM

## 2024-05-01 DIAGNOSIS — Z96.1 PSEUDOPHAKIA: ICD-10-CM

## 2024-05-01 PROCEDURE — 92015 DETERMINE REFRACTIVE STATE: CPT | Performed by: OPHTHALMOLOGY

## 2024-05-01 PROCEDURE — 92014 COMPRE OPH EXAM EST PT 1/>: CPT | Performed by: OPHTHALMOLOGY

## 2024-05-01 ASSESSMENT — TONOMETRY
OS_IOP_MMHG: 15
IOP_METHOD: GOLDMANN APPLANATION
OD_IOP_MMHG: 16

## 2024-05-01 ASSESSMENT — REFRACTION_MANIFEST
OD_ADD: +2.75
OS_ADD: +2.75
OD_AXIS: 090
OS_AXIS: 080
OD_SPHERE: +1.25
OS_CYLINDER: -1.25
OS_SPHERE: +1.00
OD_CYLINDER: -1.75

## 2024-05-01 ASSESSMENT — CUP TO DISC RATIO
OS_RATIO: .3
OD_RATIO: .3

## 2024-05-01 ASSESSMENT — ENCOUNTER SYMPTOMS
CARDIOVASCULAR NEGATIVE: 0
NEUROLOGICAL NEGATIVE: 0
CONSTITUTIONAL NEGATIVE: 0
MUSCULOSKELETAL NEGATIVE: 0
EYES NEGATIVE: 1
RESPIRATORY NEGATIVE: 0
PSYCHIATRIC NEGATIVE: 0
ENDOCRINE NEGATIVE: 0
HEMATOLOGIC/LYMPHATIC NEGATIVE: 0
GASTROINTESTINAL NEGATIVE: 0
ALLERGIC/IMMUNOLOGIC NEGATIVE: 0

## 2024-05-01 ASSESSMENT — CONF VISUAL FIELD
OS_SUPERIOR_NASAL_RESTRICTION: 0
OD_NORMAL: 1
OD_INFERIOR_NASAL_RESTRICTION: 0
OS_INFERIOR_TEMPORAL_RESTRICTION: 0
OS_NORMAL: 1
OS_SUPERIOR_TEMPORAL_RESTRICTION: 0
OD_INFERIOR_TEMPORAL_RESTRICTION: 0
OD_SUPERIOR_NASAL_RESTRICTION: 0
OS_INFERIOR_NASAL_RESTRICTION: 0
OD_SUPERIOR_TEMPORAL_RESTRICTION: 0

## 2024-05-01 ASSESSMENT — REFRACTION_WEARINGRX
OS_SPHERE: OTC +2.00
OD_SPHERE: OTC +2.00

## 2024-05-01 ASSESSMENT — VISUAL ACUITY
OS_SC: 20/25
OD_SC+: +2
OD_SC: 20/40
METHOD: SNELLEN - LINEAR

## 2024-07-08 ENCOUNTER — APPOINTMENT (OUTPATIENT)
Dept: GASTROENTEROLOGY | Facility: HOSPITAL | Age: 84
End: 2024-07-08
Payer: COMMERCIAL

## 2024-07-19 ENCOUNTER — APPOINTMENT (OUTPATIENT)
Dept: PRIMARY CARE | Facility: CLINIC | Age: 84
End: 2024-07-19
Payer: COMMERCIAL

## 2024-07-19 VITALS
SYSTOLIC BLOOD PRESSURE: 130 MMHG | OXYGEN SATURATION: 94 % | WEIGHT: 185 LBS | HEIGHT: 58 IN | BODY MASS INDEX: 38.83 KG/M2 | TEMPERATURE: 98.4 F | DIASTOLIC BLOOD PRESSURE: 72 MMHG | HEART RATE: 70 BPM

## 2024-07-19 DIAGNOSIS — K21.9 GASTROESOPHAGEAL REFLUX DISEASE WITHOUT ESOPHAGITIS: ICD-10-CM

## 2024-07-19 DIAGNOSIS — R60.9 DEPENDENT EDEMA: ICD-10-CM

## 2024-07-19 DIAGNOSIS — F32.A MILD DEPRESSION: ICD-10-CM

## 2024-07-19 DIAGNOSIS — E03.9 ACQUIRED HYPOTHYROIDISM: ICD-10-CM

## 2024-07-19 DIAGNOSIS — E78.2 MIXED HYPERLIPIDEMIA: ICD-10-CM

## 2024-07-19 DIAGNOSIS — E78.5 HYPERLIPIDEMIA, UNSPECIFIED HYPERLIPIDEMIA TYPE: ICD-10-CM

## 2024-07-19 DIAGNOSIS — R06.02 SOB (SHORTNESS OF BREATH): ICD-10-CM

## 2024-07-19 DIAGNOSIS — I10 BENIGN ESSENTIAL HYPERTENSION: Primary | ICD-10-CM

## 2024-07-19 DIAGNOSIS — M54.16 LUMBAR RADICULOPATHY: ICD-10-CM

## 2024-07-19 DIAGNOSIS — E66.9 OBESITY WITH BODY MASS INDEX 30 OR GREATER: ICD-10-CM

## 2024-07-19 DIAGNOSIS — R73.9 HYPERGLYCEMIA: ICD-10-CM

## 2024-07-19 PROBLEM — D48.5 NEOPLASM OF UNCERTAIN BEHAVIOR OF SKIN: Status: ACTIVE | Noted: 2019-10-02

## 2024-07-19 PROBLEM — H25.819 COMBINED FORMS OF AGE-RELATED CATARACT: Status: ACTIVE | Noted: 2023-04-20

## 2024-07-19 PROBLEM — L82.1 OTHER SEBORRHEIC KERATOSIS: Status: ACTIVE | Noted: 2019-10-02

## 2024-07-19 PROBLEM — L57.0 ACTINIC KERATOSIS: Status: ACTIVE | Noted: 2019-10-02

## 2024-07-19 PROBLEM — L57.9 SKIN CHANGES DUE TO CHRONIC EXPOSURE TO NONIONIZING RADIATION, UNSPECIFIED: Status: ACTIVE | Noted: 2019-10-02

## 2024-07-19 PROBLEM — L21.9 SEBORRHEIC DERMATITIS, UNSPECIFIED: Status: ACTIVE | Noted: 2019-10-02

## 2024-07-19 PROCEDURE — 3075F SYST BP GE 130 - 139MM HG: CPT | Performed by: FAMILY MEDICINE

## 2024-07-19 PROCEDURE — 1123F ACP DISCUSS/DSCN MKR DOCD: CPT | Performed by: FAMILY MEDICINE

## 2024-07-19 PROCEDURE — 1157F ADVNC CARE PLAN IN RCRD: CPT | Performed by: FAMILY MEDICINE

## 2024-07-19 PROCEDURE — 1159F MED LIST DOCD IN RCRD: CPT | Performed by: FAMILY MEDICINE

## 2024-07-19 PROCEDURE — 3078F DIAST BP <80 MM HG: CPT | Performed by: FAMILY MEDICINE

## 2024-07-19 PROCEDURE — 99215 OFFICE O/P EST HI 40 MIN: CPT | Performed by: FAMILY MEDICINE

## 2024-07-19 PROCEDURE — 1036F TOBACCO NON-USER: CPT | Performed by: FAMILY MEDICINE

## 2024-07-19 RX ORDER — LOSARTAN POTASSIUM 50 MG/1
50 TABLET ORAL DAILY
Qty: 90 TABLET | Refills: 1 | Status: SHIPPED | OUTPATIENT
Start: 2024-07-19 | End: 2025-01-15

## 2024-07-19 RX ORDER — ATENOLOL 50 MG/1
50 TABLET ORAL 2 TIMES DAILY
Qty: 180 TABLET | Refills: 0 | Status: SHIPPED | OUTPATIENT
Start: 2024-07-19 | End: 2024-10-17

## 2024-07-19 RX ORDER — ESCITALOPRAM OXALATE 10 MG/1
10 TABLET ORAL DAILY
Qty: 90 TABLET | Refills: 0 | Status: SHIPPED | OUTPATIENT
Start: 2024-07-19 | End: 2024-10-17

## 2024-07-19 RX ORDER — ROSUVASTATIN CALCIUM 10 MG/1
10 TABLET, COATED ORAL DAILY
Qty: 90 TABLET | Refills: 0 | Status: SHIPPED | OUTPATIENT
Start: 2024-07-19 | End: 2024-10-17

## 2024-07-19 RX ORDER — GABAPENTIN 100 MG/1
100 CAPSULE ORAL 3 TIMES DAILY
Qty: 270 CAPSULE | Refills: 0 | Status: SHIPPED | OUTPATIENT
Start: 2024-07-19 | End: 2024-10-17

## 2024-07-19 RX ORDER — PANTOPRAZOLE SODIUM 40 MG/1
40 TABLET, DELAYED RELEASE ORAL DAILY
Qty: 30 TABLET | Refills: 2 | Status: SHIPPED | OUTPATIENT
Start: 2024-07-19

## 2024-07-19 RX ORDER — FUROSEMIDE 20 MG/1
20 TABLET ORAL DAILY
Qty: 90 TABLET | Refills: 0 | Status: SHIPPED | OUTPATIENT
Start: 2024-07-19 | End: 2024-10-17

## 2024-07-19 RX ORDER — NAPROXEN 500 MG/1
500 TABLET ORAL DAILY PRN
Qty: 90 TABLET | Refills: 0 | Status: SHIPPED | OUTPATIENT
Start: 2024-07-19 | End: 2024-10-17

## 2024-07-19 NOTE — PROGRESS NOTES
Subjective   Patient ID: Thania Christian is a 84 y.o. female who presents for 3 MONTH FUV.    HPI   The patient presents to the clinic for a 3 month follow up. She has past medical history of hypertension, hyperlipidemia, hypothyroidism, depression, ankle edema, lumbar radiculopathy, COPD, atopic dermatitis, and JENNA.     She has been compliant with medication and denies any major side-effects to current medication.    Patient reports swelling of legs by the end of the day. Does not use salt. Avoids salty snacks. Props leg ups during the day, helps with swelling. Wakes up without swelling. Uses compression socks, only when traveling. On Lasix 20 mg daily.     Reports getting winded easily, she notes she cannot make her bed without being winded. She feels her weight is affecting her breathing and ability to exercise. No wheezing or cough. She admits she is very sedentary.  She does not get chest pain.  She recovers quickly if activity makes her winded and able to restart.  Never winded/sob at rest.  Can lay flat or w 1 pillow when sleeps, often sleeps on side    Feels her food catches in throat at times. Has issues with daily heartburn, chronic. Taking 4-5 TUMS at least daily. Worse at nighttime. Not treating this, feels gerd  is also related to weight, has worsened with increased weight. Has never been on regular heartburn medication. She drinks coffee/caffeine  in moderation.     Weight gain steady per pt.  Lives in 55 and over community.  Meals prepared for residents and eats 3 meals per day in dining room.  She states would be happy with meat and salad but a lot of casseroles and mixed foods served.  Hard to control her diet. Up 4 # since beginning of year and up 7# since last July    Recent Thyroid US unchanged.    Has seen cardiology in the past but not recently. CT chest in 2022 unremarkable. EKG in 2021.     Taking daily naproxen in the morning with breakfast, along with Crestor, losartan, Lasix, Lexapro,  "biotin.     Takes gabapentin and atenolol morning and night, after supper. Eats at about 6:30 pm. Also eats at nighttime at around 10 pm, tries to eat fruit for the most part when eats at night.     She cancelled her colonoscopy. Positive Cologuard Feb 2024. She wants to \"put it on the back burner for now\"    Review of Systems    Objective   /72   Pulse 70   Temp 36.9 °C (98.4 °F)   Ht 1.473 m (4' 10\")   Wt 83.9 kg (185 lb)   SpO2 94%   BMI 38.67 kg/m²     Physical Exam  Neck:      Vascular: No carotid bruit.      Comments: Mild thyromegaly  Cardiovascular:      Rate and Rhythm: Normal rate and regular rhythm.      Pulses: Normal pulses.      Heart sounds: Normal heart sounds.      Comments: HR 75    Pulse ox 96-97%  Pulmonary:      Effort: Pulmonary effort is normal.      Breath sounds: Normal breath sounds.   Abdominal:      General: Bowel sounds are normal.      Palpations: Abdomen is soft.   Musculoskeletal:         General: Normal range of motion.      Cervical back: Normal range of motion.      Right lower leg: No edema.      Left lower leg: No edema.      Comments: No edema at this time   Lymphadenopathy:      Cervical: No cervical adenopathy.   Skin:     General: Skin is warm and dry.   Neurological:      General: No focal deficit present.      Mental Status: She is alert and oriented to person, place, and time.   Psychiatric:         Mood and Affect: Mood normal.         Behavior: Behavior normal.         Thought Content: Thought content normal.         Judgment: Judgment normal.         Assessment/Plan     Problem List Items Addressed This Visit       Acquired hypothyroidism    Benign essential hypertension - Primary    Relevant Medications    rosuvastatin (Crestor) 10 mg tablet    losartan (Cozaar) 50 mg tablet    furosemide (Lasix) 20 mg tablet    atenolol (Tenormin) 50 mg tablet    Other Relevant Orders    CBC and Auto Differential    Mixed hyperlipidemia    Lumbar radiculopathy    Relevant " Medications    naproxen (Naprosyn) 500 mg tablet    gabapentin (Neurontin) 100 mg capsule    Mild depression    Relevant Medications    escitalopram (Lexapro) 10 mg tablet    Hyperglycemia    Relevant Orders    Hemoglobin A1C    Obesity with body mass index 30 or greater     Other Visit Diagnoses       Dependent edema        Hyperlipidemia, unspecified hyperlipidemia type        Relevant Medications    rosuvastatin (Crestor) 10 mg tablet    Other Relevant Orders    Comprehensive Metabolic Panel    Lipid Panel    Gastroesophageal reflux disease without esophagitis        Relevant Medications    pantoprazole (ProtoNix) 40 mg EC tablet    SOB (shortness of breath)        Relevant Orders    XR chest 2 views          Explained dependent edema, will get measured for compression stockings. Limiting salt, propping legs up, regular planned exercise, compression stockings recommended. Can start walking slowly and increase activity periodically as tolerated. She states can walk in hallways where she lives, has walking club there but can not keep up    Chest x ray for SOB. Will call with results    I explained heartburn could be related to shortness of breath since she has not been on meds to control it and it is the worse at night. Start pantoprazole about 1 hour prior to supper since it should be on an empty stomach and not with other medications.     Recommended colonoscopy since she has a positive Cologuard. She wants to wait    Labs in 3 months. Reviewed labs from April    FUV 3-4 weeks to reassess symptoms of heartburn and exercise tolerance.     Scribe Attestation  By signing my name below, I, Teri Joe   attest that this documentation has been prepared under the direction and in the presence of Miladys Mcleod DO.

## 2024-07-26 ENCOUNTER — HOSPITAL ENCOUNTER (OUTPATIENT)
Dept: RADIOLOGY | Facility: CLINIC | Age: 84
Discharge: HOME | End: 2024-07-26
Payer: COMMERCIAL

## 2024-07-26 DIAGNOSIS — R06.02 SOB (SHORTNESS OF BREATH): ICD-10-CM

## 2024-07-26 PROCEDURE — 71046 X-RAY EXAM CHEST 2 VIEWS: CPT

## 2024-07-31 ENCOUNTER — TELEPHONE (OUTPATIENT)
Dept: PRIMARY CARE | Facility: CLINIC | Age: 84
End: 2024-07-31
Payer: COMMERCIAL

## 2024-07-31 NOTE — TELEPHONE ENCOUNTER
----- Message from Miladys Mcleod sent at 7/30/2024  8:18 PM EDT -----  Report to pt her chest xray does not show any pneumonia

## 2024-08-13 ENCOUNTER — APPOINTMENT (OUTPATIENT)
Dept: CARDIOLOGY | Facility: HOSPITAL | Age: 84
End: 2024-08-13
Payer: COMMERCIAL

## 2024-08-13 ENCOUNTER — APPOINTMENT (OUTPATIENT)
Dept: RADIOLOGY | Facility: HOSPITAL | Age: 84
End: 2024-08-13
Payer: COMMERCIAL

## 2024-08-13 ENCOUNTER — HOSPITAL ENCOUNTER (INPATIENT)
Facility: HOSPITAL | Age: 84
LOS: 3 days | Discharge: HOME | End: 2024-08-16
Attending: EMERGENCY MEDICINE | Admitting: INTERNAL MEDICINE
Payer: COMMERCIAL

## 2024-08-13 DIAGNOSIS — R29.810 WEAKNESS ON LEFT SIDE OF FACE: Primary | ICD-10-CM

## 2024-08-13 DIAGNOSIS — R42 DIZZINESS: ICD-10-CM

## 2024-08-13 DIAGNOSIS — I63.9 CEREBROVASCULAR ACCIDENT (CVA), UNSPECIFIED MECHANISM (MULTI): ICD-10-CM

## 2024-08-13 DIAGNOSIS — I10 BENIGN ESSENTIAL HYPERTENSION: ICD-10-CM

## 2024-08-13 DIAGNOSIS — I63.89 AC ISCH MULTI VASC TERRITORIES STROKE (MULTI): ICD-10-CM

## 2024-08-13 LAB
ALBUMIN SERPL BCP-MCNC: 2.2 G/DL (ref 3.4–5)
ALP SERPL-CCNC: 35 U/L (ref 33–136)
ALT SERPL W P-5'-P-CCNC: 20 U/L (ref 7–45)
ANION GAP SERPL CALC-SCNC: 10 MMOL/L (ref 10–20)
ANION GAP SERPL CALC-SCNC: 10 MMOL/L (ref 10–20)
ANION GAP SERPL CALC-SCNC: 12 MMOL/L (ref 10–20)
APPEARANCE UR: CLEAR
APTT PPP: 26 SECONDS (ref 27–38)
AST SERPL W P-5'-P-CCNC: 13 U/L (ref 9–39)
ATRIAL RATE: 66 BPM
BASOPHILS # BLD AUTO: 0.05 X10*3/UL (ref 0–0.1)
BASOPHILS NFR BLD AUTO: 0.6 %
BILIRUB SERPL-MCNC: 0.4 MG/DL (ref 0–1.2)
BILIRUB UR STRIP.AUTO-MCNC: NEGATIVE MG/DL
BNP SERPL-MCNC: 150 PG/ML (ref 0–99)
BUN SERPL-MCNC: 13 MG/DL (ref 6–23)
BUN SERPL-MCNC: 19 MG/DL (ref 6–23)
BUN SERPL-MCNC: 19 MG/DL (ref 6–23)
CALCIUM SERPL-MCNC: 5.1 MG/DL (ref 8.6–10.3)
CALCIUM SERPL-MCNC: 8 MG/DL (ref 8.6–10.3)
CALCIUM SERPL-MCNC: 8.2 MG/DL (ref 8.6–10.3)
CARDIAC TROPONIN I PNL SERPL HS: 5 NG/L (ref 0–13)
CHLORIDE SERPL-SCNC: 106 MMOL/L (ref 98–107)
CHLORIDE SERPL-SCNC: 107 MMOL/L (ref 98–107)
CHLORIDE SERPL-SCNC: 121 MMOL/L (ref 98–107)
CHOLEST SERPL-MCNC: 127 MG/DL (ref 0–199)
CHOLESTEROL/HDL RATIO: 3.6
CO2 SERPL-SCNC: 18 MMOL/L (ref 21–32)
CO2 SERPL-SCNC: 26 MMOL/L (ref 21–32)
CO2 SERPL-SCNC: 27 MMOL/L (ref 21–32)
COLOR UR: COLORLESS
CREAT SERPL-MCNC: 0.53 MG/DL (ref 0.5–1.05)
CREAT SERPL-MCNC: 0.88 MG/DL (ref 0.5–1.05)
CREAT SERPL-MCNC: 0.93 MG/DL (ref 0.5–1.05)
EGFRCR SERPLBLD CKD-EPI 2021: 60 ML/MIN/1.73M*2
EGFRCR SERPLBLD CKD-EPI 2021: 64 ML/MIN/1.73M*2
EGFRCR SERPLBLD CKD-EPI 2021: >90 ML/MIN/1.73M*2
EOSINOPHIL # BLD AUTO: 0.23 X10*3/UL (ref 0–0.4)
EOSINOPHIL NFR BLD AUTO: 3 %
ERYTHROCYTE [DISTWIDTH] IN BLOOD BY AUTOMATED COUNT: 12.5 % (ref 11.5–14.5)
ERYTHROCYTE [DISTWIDTH] IN BLOOD BY AUTOMATED COUNT: 12.5 % (ref 11.5–14.5)
GLUCOSE BLD MANUAL STRIP-MCNC: 120 MG/DL (ref 74–99)
GLUCOSE SERPL-MCNC: 74 MG/DL (ref 74–99)
GLUCOSE SERPL-MCNC: 83 MG/DL (ref 74–99)
GLUCOSE SERPL-MCNC: 91 MG/DL (ref 74–99)
GLUCOSE UR STRIP.AUTO-MCNC: NORMAL MG/DL
HCT VFR BLD AUTO: 33.4 % (ref 36–46)
HCT VFR BLD AUTO: 37.3 % (ref 36–46)
HDLC SERPL-MCNC: 35.1 MG/DL
HGB BLD-MCNC: 10.8 G/DL (ref 12–16)
HGB BLD-MCNC: 12.3 G/DL (ref 12–16)
IMM GRANULOCYTES # BLD AUTO: 0.04 X10*3/UL (ref 0–0.5)
IMM GRANULOCYTES NFR BLD AUTO: 0.5 % (ref 0–0.9)
INR PPP: 1.1 (ref 0.9–1.1)
KETONES UR STRIP.AUTO-MCNC: NEGATIVE MG/DL
LDLC SERPL CALC-MCNC: 75 MG/DL
LEUKOCYTE ESTERASE UR QL STRIP.AUTO: NEGATIVE
LYMPHOCYTES # BLD AUTO: 2.31 X10*3/UL (ref 0.8–3)
LYMPHOCYTES NFR BLD AUTO: 29.7 %
MCH RBC QN AUTO: 28.5 PG (ref 26–34)
MCH RBC QN AUTO: 28.9 PG (ref 26–34)
MCHC RBC AUTO-ENTMCNC: 32.3 G/DL (ref 32–36)
MCHC RBC AUTO-ENTMCNC: 33 G/DL (ref 32–36)
MCV RBC AUTO: 88 FL (ref 80–100)
MCV RBC AUTO: 88 FL (ref 80–100)
MONOCYTES # BLD AUTO: 1.1 X10*3/UL (ref 0.05–0.8)
MONOCYTES NFR BLD AUTO: 14.1 %
NEUTROPHILS # BLD AUTO: 4.05 X10*3/UL (ref 1.6–5.5)
NEUTROPHILS NFR BLD AUTO: 52.1 %
NITRITE UR QL STRIP.AUTO: NEGATIVE
NON HDL CHOLESTEROL: 92 MG/DL (ref 0–149)
NRBC BLD-RTO: 0 /100 WBCS (ref 0–0)
NRBC BLD-RTO: 0 /100 WBCS (ref 0–0)
P AXIS: 38 DEGREES
P OFFSET: 153 MS
P ONSET: 104 MS
PH UR STRIP.AUTO: 5 [PH]
PLATELET # BLD AUTO: 224 X10*3/UL (ref 150–450)
PLATELET # BLD AUTO: 248 X10*3/UL (ref 150–450)
POTASSIUM SERPL-SCNC: 2.5 MMOL/L (ref 3.5–5.3)
POTASSIUM SERPL-SCNC: 3.5 MMOL/L (ref 3.5–5.3)
POTASSIUM SERPL-SCNC: 4 MMOL/L (ref 3.5–5.3)
PR INTERVAL: 234 MS
PROT SERPL-MCNC: 3.9 G/DL (ref 6.4–8.2)
PROT UR STRIP.AUTO-MCNC: NEGATIVE MG/DL
PROTHROMBIN TIME: 11.9 SECONDS (ref 9.8–12.8)
Q ONSET: 221 MS
QRS COUNT: 11 BEATS
QRS DURATION: 68 MS
QT INTERVAL: 402 MS
QTC CALCULATION(BAZETT): 421 MS
QTC FREDERICIA: 415 MS
R AXIS: -38 DEGREES
RBC # BLD AUTO: 3.79 X10*6/UL (ref 4–5.2)
RBC # BLD AUTO: 4.25 X10*6/UL (ref 4–5.2)
RBC # UR STRIP.AUTO: NEGATIVE /UL
SODIUM SERPL-SCNC: 140 MMOL/L (ref 136–145)
SODIUM SERPL-SCNC: 140 MMOL/L (ref 136–145)
SODIUM SERPL-SCNC: 146 MMOL/L (ref 136–145)
SP GR UR STRIP.AUTO: 1.01
T AXIS: 53 DEGREES
T OFFSET: 422 MS
T4 FREE SERPL-MCNC: 0.88 NG/DL (ref 0.61–1.12)
TRIGL SERPL-MCNC: 87 MG/DL (ref 0–149)
TSH SERPL-ACNC: 6.54 MIU/L (ref 0.44–3.98)
UROBILINOGEN UR STRIP.AUTO-MCNC: NORMAL MG/DL
VENTRICULAR RATE: 66 BPM
VLDL: 17 MG/DL (ref 0–40)
WBC # BLD AUTO: 7.6 X10*3/UL (ref 4.4–11.3)
WBC # BLD AUTO: 7.8 X10*3/UL (ref 4.4–11.3)

## 2024-08-13 PROCEDURE — 85027 COMPLETE CBC AUTOMATED: CPT | Performed by: INTERNAL MEDICINE

## 2024-08-13 PROCEDURE — 80048 BASIC METABOLIC PNL TOTAL CA: CPT | Mod: CCI | Performed by: EMERGENCY MEDICINE

## 2024-08-13 PROCEDURE — 84439 ASSAY OF FREE THYROXINE: CPT | Performed by: INTERNAL MEDICINE

## 2024-08-13 PROCEDURE — 2500000004 HC RX 250 GENERAL PHARMACY W/ HCPCS (ALT 636 FOR OP/ED): Performed by: EMERGENCY MEDICINE

## 2024-08-13 PROCEDURE — 36415 COLL VENOUS BLD VENIPUNCTURE: CPT | Performed by: EMERGENCY MEDICINE

## 2024-08-13 PROCEDURE — 82947 ASSAY GLUCOSE BLOOD QUANT: CPT

## 2024-08-13 PROCEDURE — 80053 COMPREHEN METABOLIC PANEL: CPT | Performed by: EMERGENCY MEDICINE

## 2024-08-13 PROCEDURE — 74177 CT ABD & PELVIS W/CONTRAST: CPT

## 2024-08-13 PROCEDURE — 70498 CT ANGIOGRAPHY NECK: CPT | Performed by: RADIOLOGY

## 2024-08-13 PROCEDURE — 2550000001 HC RX 255 CONTRASTS: Performed by: EMERGENCY MEDICINE

## 2024-08-13 PROCEDURE — 2500000004 HC RX 250 GENERAL PHARMACY W/ HCPCS (ALT 636 FOR OP/ED): Performed by: INTERNAL MEDICINE

## 2024-08-13 PROCEDURE — 70496 CT ANGIOGRAPHY HEAD: CPT | Performed by: RADIOLOGY

## 2024-08-13 PROCEDURE — 93005 ELECTROCARDIOGRAM TRACING: CPT

## 2024-08-13 PROCEDURE — 80048 BASIC METABOLIC PNL TOTAL CA: CPT | Mod: CCI | Performed by: INTERNAL MEDICINE

## 2024-08-13 PROCEDURE — 84484 ASSAY OF TROPONIN QUANT: CPT | Performed by: EMERGENCY MEDICINE

## 2024-08-13 PROCEDURE — 70498 CT ANGIOGRAPHY NECK: CPT

## 2024-08-13 PROCEDURE — 80061 LIPID PANEL: CPT | Performed by: INTERNAL MEDICINE

## 2024-08-13 PROCEDURE — 96361 HYDRATE IV INFUSION ADD-ON: CPT

## 2024-08-13 PROCEDURE — 96374 THER/PROPH/DIAG INJ IV PUSH: CPT

## 2024-08-13 PROCEDURE — 83880 ASSAY OF NATRIURETIC PEPTIDE: CPT | Performed by: INTERNAL MEDICINE

## 2024-08-13 PROCEDURE — 85610 PROTHROMBIN TIME: CPT | Performed by: EMERGENCY MEDICINE

## 2024-08-13 PROCEDURE — 2500000001 HC RX 250 WO HCPCS SELF ADMINISTERED DRUGS (ALT 637 FOR MEDICARE OP): Performed by: INTERNAL MEDICINE

## 2024-08-13 PROCEDURE — 70450 CT HEAD/BRAIN W/O DYE: CPT

## 2024-08-13 PROCEDURE — 85730 THROMBOPLASTIN TIME PARTIAL: CPT | Performed by: EMERGENCY MEDICINE

## 2024-08-13 PROCEDURE — 74177 CT ABD & PELVIS W/CONTRAST: CPT | Mod: FOREIGN READ | Performed by: RADIOLOGY

## 2024-08-13 PROCEDURE — 70450 CT HEAD/BRAIN W/O DYE: CPT | Performed by: RADIOLOGY

## 2024-08-13 PROCEDURE — 1200000002 HC GENERAL ROOM WITH TELEMETRY DAILY

## 2024-08-13 PROCEDURE — 83036 HEMOGLOBIN GLYCOSYLATED A1C: CPT | Mod: AHULAB | Performed by: INTERNAL MEDICINE

## 2024-08-13 PROCEDURE — 99285 EMERGENCY DEPT VISIT HI MDM: CPT

## 2024-08-13 PROCEDURE — 84443 ASSAY THYROID STIM HORMONE: CPT | Performed by: INTERNAL MEDICINE

## 2024-08-13 PROCEDURE — 81003 URINALYSIS AUTO W/O SCOPE: CPT | Performed by: EMERGENCY MEDICINE

## 2024-08-13 PROCEDURE — 85025 COMPLETE CBC W/AUTO DIFF WBC: CPT | Performed by: EMERGENCY MEDICINE

## 2024-08-13 RX ORDER — ROSUVASTATIN CALCIUM 10 MG/1
10 TABLET, COATED ORAL DAILY
Status: DISCONTINUED | OUTPATIENT
Start: 2024-08-13 | End: 2024-08-13

## 2024-08-13 RX ORDER — PROCHLORPERAZINE EDISYLATE 5 MG/ML
5 INJECTION INTRAMUSCULAR; INTRAVENOUS ONCE
Status: COMPLETED | OUTPATIENT
Start: 2024-08-13 | End: 2024-08-13

## 2024-08-13 RX ORDER — FUROSEMIDE 20 MG/1
20 TABLET ORAL DAILY
Status: DISCONTINUED | OUTPATIENT
Start: 2024-08-13 | End: 2024-08-16 | Stop reason: HOSPADM

## 2024-08-13 RX ORDER — ONDANSETRON HYDROCHLORIDE 2 MG/ML
4 INJECTION, SOLUTION INTRAVENOUS EVERY 8 HOURS PRN
Status: DISCONTINUED | OUTPATIENT
Start: 2024-08-13 | End: 2024-08-16 | Stop reason: HOSPADM

## 2024-08-13 RX ORDER — PANTOPRAZOLE SODIUM 40 MG/10ML
40 INJECTION, POWDER, LYOPHILIZED, FOR SOLUTION INTRAVENOUS
Status: DISCONTINUED | OUTPATIENT
Start: 2024-08-14 | End: 2024-08-13

## 2024-08-13 RX ORDER — GUAIFENESIN 600 MG/1
600 TABLET, EXTENDED RELEASE ORAL EVERY 12 HOURS PRN
Status: DISCONTINUED | OUTPATIENT
Start: 2024-08-13 | End: 2024-08-16 | Stop reason: HOSPADM

## 2024-08-13 RX ORDER — ACETAMINOPHEN 325 MG/1
650 TABLET ORAL EVERY 4 HOURS PRN
Status: DISCONTINUED | OUTPATIENT
Start: 2024-08-13 | End: 2024-08-16 | Stop reason: HOSPADM

## 2024-08-13 RX ORDER — ESCITALOPRAM OXALATE 10 MG/1
10 TABLET ORAL DAILY
Status: DISCONTINUED | OUTPATIENT
Start: 2024-08-13 | End: 2024-08-16 | Stop reason: HOSPADM

## 2024-08-13 RX ORDER — ENOXAPARIN SODIUM 100 MG/ML
40 INJECTION SUBCUTANEOUS EVERY 24 HOURS
Status: DISCONTINUED | OUTPATIENT
Start: 2024-08-13 | End: 2024-08-16 | Stop reason: HOSPADM

## 2024-08-13 RX ORDER — ONDANSETRON 4 MG/1
4 TABLET, FILM COATED ORAL EVERY 8 HOURS PRN
Status: DISCONTINUED | OUTPATIENT
Start: 2024-08-13 | End: 2024-08-16 | Stop reason: HOSPADM

## 2024-08-13 RX ORDER — ATORVASTATIN CALCIUM 80 MG/1
80 TABLET, FILM COATED ORAL NIGHTLY
Status: DISCONTINUED | OUTPATIENT
Start: 2024-08-13 | End: 2024-08-16 | Stop reason: HOSPADM

## 2024-08-13 RX ORDER — PANTOPRAZOLE SODIUM 40 MG/10ML
40 INJECTION, POWDER, LYOPHILIZED, FOR SOLUTION INTRAVENOUS
Status: DISCONTINUED | OUTPATIENT
Start: 2024-08-14 | End: 2024-08-16 | Stop reason: HOSPADM

## 2024-08-13 RX ORDER — PANTOPRAZOLE SODIUM 40 MG/1
40 TABLET, DELAYED RELEASE ORAL
Status: DISCONTINUED | OUTPATIENT
Start: 2024-08-14 | End: 2024-08-16 | Stop reason: HOSPADM

## 2024-08-13 RX ORDER — ASPIRIN 81 MG/1
81 TABLET ORAL DAILY
Status: DISCONTINUED | OUTPATIENT
Start: 2024-08-14 | End: 2024-08-13

## 2024-08-13 RX ORDER — POLYETHYLENE GLYCOL 3350 17 G/17G
17 POWDER, FOR SOLUTION ORAL DAILY PRN
Status: DISCONTINUED | OUTPATIENT
Start: 2024-08-13 | End: 2024-08-16 | Stop reason: HOSPADM

## 2024-08-13 RX ORDER — ENOXAPARIN SODIUM 100 MG/ML
40 INJECTION SUBCUTANEOUS EVERY 24 HOURS
Status: DISCONTINUED | OUTPATIENT
Start: 2024-08-13 | End: 2024-08-13

## 2024-08-13 RX ORDER — MORPHINE SULFATE 4 MG/ML
4 INJECTION, SOLUTION INTRAMUSCULAR; INTRAVENOUS ONCE
Status: DISCONTINUED | OUTPATIENT
Start: 2024-08-13 | End: 2024-08-16 | Stop reason: HOSPADM

## 2024-08-13 RX ORDER — HYDRALAZINE HYDROCHLORIDE 20 MG/ML
10 INJECTION INTRAMUSCULAR; INTRAVENOUS
Status: ACTIVE | OUTPATIENT
Start: 2024-08-13 | End: 2024-08-15

## 2024-08-13 RX ORDER — HYDRALAZINE HYDROCHLORIDE 25 MG/1
25 TABLET, FILM COATED ORAL EVERY 6 HOURS PRN
Status: DISCONTINUED | OUTPATIENT
Start: 2024-08-15 | End: 2024-08-16 | Stop reason: HOSPADM

## 2024-08-13 RX ORDER — NAPROXEN SODIUM 220 MG/1
81 TABLET, FILM COATED ORAL DAILY
Status: DISCONTINUED | OUTPATIENT
Start: 2024-08-13 | End: 2024-08-16 | Stop reason: HOSPADM

## 2024-08-13 RX ORDER — ACETAMINOPHEN 325 MG/1
975 TABLET ORAL ONCE
Status: COMPLETED | OUTPATIENT
Start: 2024-08-13 | End: 2024-08-13

## 2024-08-13 RX ORDER — GABAPENTIN 100 MG/1
100 CAPSULE ORAL 3 TIMES DAILY
Status: DISCONTINUED | OUTPATIENT
Start: 2024-08-13 | End: 2024-08-16 | Stop reason: HOSPADM

## 2024-08-13 RX ORDER — ACETAMINOPHEN 160 MG/5ML
650 SOLUTION ORAL EVERY 4 HOURS PRN
Status: DISCONTINUED | OUTPATIENT
Start: 2024-08-13 | End: 2024-08-16 | Stop reason: HOSPADM

## 2024-08-13 RX ORDER — LABETALOL HYDROCHLORIDE 5 MG/ML
10 INJECTION, SOLUTION INTRAVENOUS EVERY 10 MIN PRN
Status: DISCONTINUED | OUTPATIENT
Start: 2024-08-13 | End: 2024-08-13

## 2024-08-13 RX ORDER — ACETAMINOPHEN 650 MG/1
650 SUPPOSITORY RECTAL EVERY 4 HOURS PRN
Status: DISCONTINUED | OUTPATIENT
Start: 2024-08-13 | End: 2024-08-16 | Stop reason: HOSPADM

## 2024-08-13 RX ORDER — TALC
3 POWDER (GRAM) TOPICAL NIGHTLY PRN
Status: DISCONTINUED | OUTPATIENT
Start: 2024-08-13 | End: 2024-08-16 | Stop reason: HOSPADM

## 2024-08-13 RX ORDER — LOSARTAN POTASSIUM 50 MG/1
50 TABLET ORAL DAILY
Status: DISCONTINUED | OUTPATIENT
Start: 2024-08-13 | End: 2024-08-16 | Stop reason: HOSPADM

## 2024-08-13 RX ORDER — PANTOPRAZOLE SODIUM 40 MG/1
40 TABLET, DELAYED RELEASE ORAL
Status: DISCONTINUED | OUTPATIENT
Start: 2024-08-14 | End: 2024-08-13

## 2024-08-13 ASSESSMENT — PAIN SCALES - GENERAL
PAINLEVEL_OUTOF10: 7
PAINLEVEL_OUTOF10: 5 - MODERATE PAIN
PAINLEVEL_OUTOF10: 1
PAINLEVEL_OUTOF10: 6

## 2024-08-13 ASSESSMENT — PAIN DESCRIPTION - ORIENTATION
ORIENTATION: RIGHT
ORIENTATION: RIGHT

## 2024-08-13 ASSESSMENT — COLUMBIA-SUICIDE SEVERITY RATING SCALE - C-SSRS
1. IN THE PAST MONTH, HAVE YOU WISHED YOU WERE DEAD OR WISHED YOU COULD GO TO SLEEP AND NOT WAKE UP?: NO
6. HAVE YOU EVER DONE ANYTHING, STARTED TO DO ANYTHING, OR PREPARED TO DO ANYTHING TO END YOUR LIFE?: NO
2. HAVE YOU ACTUALLY HAD ANY THOUGHTS OF KILLING YOURSELF?: NO

## 2024-08-13 ASSESSMENT — PAIN DESCRIPTION - PAIN TYPE
TYPE: CHRONIC PAIN
TYPE: ACUTE PAIN

## 2024-08-13 ASSESSMENT — PAIN DESCRIPTION - LOCATION
LOCATION: ARM
LOCATION: HEAD
LOCATION: ARM

## 2024-08-13 ASSESSMENT — PAIN - FUNCTIONAL ASSESSMENT: PAIN_FUNCTIONAL_ASSESSMENT: 0-10

## 2024-08-13 NOTE — ED TRIAGE NOTES
Pt arrived to the ED with C/O pt states that she has been having some dizziness for a couple of day along with difficulty swallowing. Pt states that she has been very weak as well for a couple of days. Pt also states that she did have fractures to both arms in the past and has been having extreme weakness to both arms. Pt denies SOB, chest pain, and N/V.

## 2024-08-13 NOTE — ED PROVIDER NOTES
"HPI   Chief Complaint   Patient presents with    Dizziness    Dysphagia       The patient is an 84-year-old female presenting with dizziness, difficulty swallowing, diminished sensation over her left arm and leg.  States that beginning over the weekend she was having intermittent episodes of dizziness, denies any chest pain or palpitations, not seems to be precipitated by any notable factor.  Denies any chest pain, dyspnea.  Notes that dizziness does not seem to be precipitated by head movement or activity.  No symptoms persist until last night, states that she was feeling better and nearly back to baseline last night, however awoke this morning at approximately 7 AM (went to bed around 2 AM) noting that she felt \"off\" and had a difficult time getting out of bed.  Notes that around noon while eating lunch, she began to notice difficulty swallowing and worsening dizziness, prompting EMS call.  Notes that dizziness is improving and she is able to swallow secretions currently but notes some persistent symptoms currently.  Denies any sore throat, sensation of voice change.  Denies any dyspnea.  Does note ongoing nausea, mild epigastric abdominal discomfort.  Denies any other recent illness or injury.              Patient History   Past Medical History:   Diagnosis Date    Encounter for general adult medical examination without abnormal findings 09/30/2019    Encounter for preventive health examination    Essential (primary) hypertension 09/28/2018    Benign essential hypertension    Personal history of other diseases of the nervous system and sense organs 07/22/2020    History of trigeminal neuralgia    Personal history of other specified conditions 07/22/2020    History of facial pain    Unspecified cataract     Cataracts, both eyes     Past Surgical History:   Procedure Laterality Date    MR HEAD ANGIO WO IV CONTRAST  11/20/2020    MR HEAD ANGIO WO IV CONTRAST 11/20/2020 [facility] [place] [facility]     No family history on " file.  Social History     Tobacco Use    Smoking status: Never    Smokeless tobacco: Never   Substance Use Topics    Alcohol use: Yes     Alcohol/week: 2.0 standard drinks of alcohol     Types: 2 Standard drinks or equivalent per week     Comment: has happy hour on tues    Drug use: Never       Physical Exam   ED Triage Vitals [08/13/24 1437]   Temp Heart Rate Respirations BP   -- 64 16 163/81      Pulse Ox Temp src Heart Rate Source Patient Position   94 % -- Monitor --      BP Location FiO2 (%)     -- --       Physical Exam  Vitals and nursing note reviewed.   Constitutional:       General: She is not in acute distress.     Appearance: Normal appearance. She is not ill-appearing or toxic-appearing.   HENT:      Head: Normocephalic and atraumatic.      Nose: No congestion or rhinorrhea.      Mouth/Throat:      Mouth: Mucous membranes are moist. No oral lesions or angioedema.      Pharynx: Oropharynx is clear. No pharyngeal swelling, oropharyngeal exudate, posterior oropharyngeal erythema, uvula swelling or postnasal drip.      Tonsils: No tonsillar exudate or tonsillar abscesses.      Comments: No anterior neck tenderness, no trismus, full range of motion of the neck without pain.  Eyes:      General: No visual field deficit.     Extraocular Movements: Extraocular movements intact.      Right eye: Normal extraocular motion.      Left eye: Normal extraocular motion.      Conjunctiva/sclera: Conjunctivae normal.      Pupils: Pupils are equal, round, and reactive to light.   Neck:      Thyroid: No thyroid tenderness.   Cardiovascular:      Rate and Rhythm: Normal rate and regular rhythm.      Pulses: Normal pulses.      Heart sounds: Normal heart sounds, S1 normal and S2 normal. No murmur heard.     No friction rub. No gallop.   Pulmonary:      Effort: Pulmonary effort is normal. No respiratory distress.      Breath sounds: Normal breath sounds. No stridor. No wheezing, rhonchi or rales.   Abdominal:      General:  Abdomen is flat. Bowel sounds are normal. There is no distension.      Palpations: Abdomen is soft.      Tenderness: There is abdominal tenderness in the right upper quadrant, epigastric area and left upper quadrant. There is no right CVA tenderness, left CVA tenderness, guarding or rebound.   Musculoskeletal:      Cervical back: Full passive range of motion without pain, normal range of motion and neck supple.      Right lower leg: No edema.      Left lower leg: No edema.   Skin:     General: Skin is warm and dry.   Neurological:      General: No focal deficit present.      Mental Status: She is alert and oriented to person, place, and time. Mental status is at baseline.      GCS: GCS eye subscore is 4. GCS verbal subscore is 5. GCS motor subscore is 6.      Cranial Nerves: Facial asymmetry (Slight left sided facial droop) present. No cranial nerve deficit.      Sensory: No sensory deficit.      Motor: No weakness, tremor or abnormal muscle tone.      Coordination: Finger-Nose-Finger Test abnormal (Dysmetria bilateral upper extremities).      Comments: 1a  Level of consciousness: 0=alert; keenly responsive  1b. LOC questions:  0=Performs both tasks correctly  1c. LOC commands: 0=Performs both tasks correctly  2.  Best Gaze: 0=normal  3. Visual: 0=No visual loss  4. Facial Palsy: 1=Minor paralysis (flattened nasolabial fold, asymmetric on smiling)  5a. Motor left arm: 0=No drift, limb holds 90 (or 45) degrees for full 10 seconds  5b.  Motor right arm: 0=No drift, limb holds 90 (or 45) degrees for full 10 seconds  6a. motor left le=Drift, limb holds 90 (or 45) degrees but drifts down before full 10 seconds: does not hit bed (chronic per patient)  6b  Motor right le=No drift, limb holds 90 (or 45) degrees for full 10 seconds  7. Limb Ataxia: 2=Present in two limbs  8.  Sensory: 1=Mild to moderate sensory loss; patient feels pinprick is less sharp or is dull on the affected side; there is a loss of superficial  "pain with pinprick but patient is aware She is being touched  9. Best Language:  0=No aphasia, normal  10. Dysarthria: 0=Normal  11. Extinction and Inattention: 0=No abnormality    Total:   4        VAN: VAN: Negative       Psychiatric:         Mood and Affect: Mood normal.           ED Course & MDM   ED Course as of 08/15/24 1052   Tue Aug 13, 2024   1507 Assessed the patient at approximately 245 p.m., patient with noted dysphagia and dizziness beginning around noon however she notes that she initially noted feeling like she was having a hard time getting out of bed this morning at approximately 7 AM, notes feeling \"off\" with difficulty ambulating throughout the course of the day today, went to bed around 2 AM last night in her normal state of health.  Had noted intermittent dizziness since Saturday.  On examination patient with mild dysarthria, minimal left-sided facial droop with sparing of the forehead, diminished sensation of the left face, left arm, left leg.  Does have difficulty elevating left leg off the bed but notes that she has had issues with this leg for approximately 1 year, does not feel this is new.  No symptoms are improving but still having difficulty ambulating to the bathroom due to dizziness, is notably ataxic on examination.  Out of concern for acute ischemic CVA, stroke alert was activated at approximately 3 PM, given NIHSS of 6 (although somewhat confounded by chronic left lower extremity weakness) CT angiography of the head was ordered as well to assess for LVO, dissection [BR]   1532 Patient CT head showing no acute hemorrhage, large vessel stroke, mass.  CT angiography pending but does not appear to demonstrate gross cut off of any of the major vessels.  Discussed the case with Dr. Jeffers of stroke neurology via the transfer center who agreed that given the progression of symptoms last several days with waxing/waning symptoms as well as difficulty ambulating beginning at 7 AM today, " patient would not be a good candidate for TNK given absolute last known well being 2 AM last night as well as some nonlocalizing symptoms.  I did have risk/benefits discussion with patient's family regarding TNK namely the out of concern that patient is outside of the intervention window, they agree with decision to defer this therapy at this time.  Remainder of metabolic, infectious workup pending at this time.  Overall plan for admission. [BR]      ED Course User Index  [BR] Brian Lebron MD         Diagnoses as of 08/15/24 1052   Dizziness   Weakness on left side of face                 No data recorded                                 Medical Decision Making  Patient presenting with intermittent dizziness over the last several days, notes near return to baseline yesterday evening with last known well approximately 2 AM this morning, awoke around 7 AM with difficulty ambulating then worsening dizziness, dysphagia around noon.  On examination patient does have diminished incision over her left face, left arm, left leg, does have very slight flattening of the nasolabial fold on the left side on initial examination raise concern for CVA.  Does have ataxia of her upper extremities but involves both the left and right upper extremity which seems somewhat atypical for central cause.  Does have drift of the left lower extremity however patient notes that she has had motor issues in this leg for years, this appears chronic for the patient.  Given concern for acute CVA with last known well within 24 hours, did activate stroke alert, patient was taken immediately to CT, did obtain CT/CT angiography which was unremarkable for acute process.  Patient was endorsing nausea and had some mild abdominal tenderness therefore/further with CMP, lipase, CT abdomen pelvis to assess for acute intra-abdominal process/hepatobiliary process, these were unremarkable.  Discussed the case with stroke neurology via the transfer center, I did  confirm with patient when she believes her last known well was when she thought to be approximately 2 AM this morning as she was persistently experiencing difficulty ambulating throughout the morning prior to presentation.  Given this as well as somewhat nonlocalizing examination, stroke neurology agrees that the patient is not a good TNK candidate, I did discuss indications and contraindications for TN K with the patient and family at bedside, they verbally convey understanding to this and agree with plan to hold TNK.  At time of signout patient stable, still symptomatic sensation of her left side but dizziness is improving, will treat symptomatically with antiemetics/IV fluids.  Patient's initial labs showing hyperchloremia, significant hypokalemia, however suspect this is secondary to saline contaminant, will repeat labs to confirm.  Patient signed out pending repeat labs, reassessment, overall plan for admission for further evaluation of symptoms including neurology consultation, MRI.  Patient signed out in stable condition.        Procedure  Procedures     Brian Lebron MD  08/15/24 5927

## 2024-08-14 ENCOUNTER — APPOINTMENT (OUTPATIENT)
Dept: RADIOLOGY | Facility: HOSPITAL | Age: 84
End: 2024-08-14
Payer: COMMERCIAL

## 2024-08-14 LAB
ANION GAP SERPL CALC-SCNC: 13 MMOL/L (ref 10–20)
APPEARANCE UR: CLEAR
BACTERIA #/AREA URNS AUTO: ABNORMAL /HPF
BASOPHILS # BLD AUTO: 0.04 X10*3/UL (ref 0–0.1)
BASOPHILS NFR BLD AUTO: 0.6 %
BILIRUB UR STRIP.AUTO-MCNC: NEGATIVE MG/DL
BUN SERPL-MCNC: 19 MG/DL (ref 6–23)
CALCIUM SERPL-MCNC: 8.5 MG/DL (ref 8.6–10.3)
CHLORIDE SERPL-SCNC: 106 MMOL/L (ref 98–107)
CO2 SERPL-SCNC: 27 MMOL/L (ref 21–32)
COLOR UR: ABNORMAL
CREAT SERPL-MCNC: 0.94 MG/DL (ref 0.5–1.05)
D DIMER PPP FEU-MCNC: 1373 NG/ML FEU
EGFRCR SERPLBLD CKD-EPI 2021: 60 ML/MIN/1.73M*2
EOSINOPHIL # BLD AUTO: 0.24 X10*3/UL (ref 0–0.4)
EOSINOPHIL NFR BLD AUTO: 3.9 %
ERYTHROCYTE [DISTWIDTH] IN BLOOD BY AUTOMATED COUNT: 12.4 % (ref 11.5–14.5)
EST. AVERAGE GLUCOSE BLD GHB EST-MCNC: 108 MG/DL
GLUCOSE SERPL-MCNC: 96 MG/DL (ref 74–99)
GLUCOSE UR STRIP.AUTO-MCNC: NORMAL MG/DL
HBA1C MFR BLD: 5.4 %
HCT VFR BLD AUTO: 37.7 % (ref 36–46)
HGB BLD-MCNC: 11.7 G/DL (ref 12–16)
IMM GRANULOCYTES # BLD AUTO: 0.02 X10*3/UL (ref 0–0.5)
IMM GRANULOCYTES NFR BLD AUTO: 0.3 % (ref 0–0.9)
KETONES UR STRIP.AUTO-MCNC: NEGATIVE MG/DL
LEUKOCYTE ESTERASE UR QL STRIP.AUTO: ABNORMAL
LYMPHOCYTES # BLD AUTO: 1.95 X10*3/UL (ref 0.8–3)
LYMPHOCYTES NFR BLD AUTO: 31.5 %
MCH RBC QN AUTO: 28.6 PG (ref 26–34)
MCHC RBC AUTO-ENTMCNC: 31 G/DL (ref 32–36)
MCV RBC AUTO: 92 FL (ref 80–100)
MONOCYTES # BLD AUTO: 0.92 X10*3/UL (ref 0.05–0.8)
MONOCYTES NFR BLD AUTO: 14.8 %
NEUTROPHILS # BLD AUTO: 3.03 X10*3/UL (ref 1.6–5.5)
NEUTROPHILS NFR BLD AUTO: 48.9 %
NITRITE UR QL STRIP.AUTO: NEGATIVE
NRBC BLD-RTO: 0 /100 WBCS (ref 0–0)
PH UR STRIP.AUTO: 5.5 [PH]
PLATELET # BLD AUTO: 214 X10*3/UL (ref 150–450)
POTASSIUM SERPL-SCNC: 4.1 MMOL/L (ref 3.5–5.3)
PROT UR STRIP.AUTO-MCNC: NEGATIVE MG/DL
RBC # BLD AUTO: 4.09 X10*6/UL (ref 4–5.2)
RBC # UR STRIP.AUTO: NEGATIVE /UL
RBC #/AREA URNS AUTO: ABNORMAL /HPF
SODIUM SERPL-SCNC: 142 MMOL/L (ref 136–145)
SP GR UR STRIP.AUTO: 1.04
SQUAMOUS #/AREA URNS AUTO: ABNORMAL /HPF
T4 FREE SERPL-MCNC: 0.99 NG/DL (ref 0.61–1.12)
UROBILINOGEN UR STRIP.AUTO-MCNC: NORMAL MG/DL
WBC # BLD AUTO: 6.2 X10*3/UL (ref 4.4–11.3)
WBC #/AREA URNS AUTO: ABNORMAL /HPF

## 2024-08-14 PROCEDURE — 84439 ASSAY OF FREE THYROXINE: CPT

## 2024-08-14 PROCEDURE — 70544 MR ANGIOGRAPHY HEAD W/O DYE: CPT

## 2024-08-14 PROCEDURE — 36415 COLL VENOUS BLD VENIPUNCTURE: CPT | Performed by: INTERNAL MEDICINE

## 2024-08-14 PROCEDURE — 71046 X-RAY EXAM CHEST 2 VIEWS: CPT | Performed by: RADIOLOGY

## 2024-08-14 PROCEDURE — 2500000001 HC RX 250 WO HCPCS SELF ADMINISTERED DRUGS (ALT 637 FOR MEDICARE OP): Performed by: INTERNAL MEDICINE

## 2024-08-14 PROCEDURE — 2500000004 HC RX 250 GENERAL PHARMACY W/ HCPCS (ALT 636 FOR OP/ED): Performed by: INTERNAL MEDICINE

## 2024-08-14 PROCEDURE — 70547 MR ANGIOGRAPHY NECK W/O DYE: CPT

## 2024-08-14 PROCEDURE — 36415 COLL VENOUS BLD VENIPUNCTURE: CPT

## 2024-08-14 PROCEDURE — 70551 MRI BRAIN STEM W/O DYE: CPT | Performed by: RADIOLOGY

## 2024-08-14 PROCEDURE — 2500000005 HC RX 250 GENERAL PHARMACY W/O HCPCS: Performed by: INTERNAL MEDICINE

## 2024-08-14 PROCEDURE — 99222 1ST HOSP IP/OBS MODERATE 55: CPT | Performed by: INTERNAL MEDICINE

## 2024-08-14 PROCEDURE — 1200000002 HC GENERAL ROOM WITH TELEMETRY DAILY

## 2024-08-14 PROCEDURE — 92610 EVALUATE SWALLOWING FUNCTION: CPT | Mod: GN

## 2024-08-14 PROCEDURE — 74220 X-RAY XM ESOPHAGUS 1CNTRST: CPT | Performed by: RADIOLOGY

## 2024-08-14 PROCEDURE — 97161 PT EVAL LOW COMPLEX 20 MIN: CPT | Mod: GP

## 2024-08-14 PROCEDURE — 70551 MRI BRAIN STEM W/O DYE: CPT

## 2024-08-14 PROCEDURE — 99223 1ST HOSP IP/OBS HIGH 75: CPT | Performed by: PSYCHIATRY & NEUROLOGY

## 2024-08-14 PROCEDURE — 74220 X-RAY XM ESOPHAGUS 1CNTRST: CPT

## 2024-08-14 PROCEDURE — 80048 BASIC METABOLIC PNL TOTAL CA: CPT | Performed by: INTERNAL MEDICINE

## 2024-08-14 PROCEDURE — 99232 SBSQ HOSP IP/OBS MODERATE 35: CPT

## 2024-08-14 PROCEDURE — A9698 NON-RAD CONTRAST MATERIALNOC: HCPCS | Performed by: INTERNAL MEDICINE

## 2024-08-14 PROCEDURE — 85379 FIBRIN DEGRADATION QUANT: CPT | Performed by: INTERNAL MEDICINE

## 2024-08-14 PROCEDURE — 81001 URINALYSIS AUTO W/SCOPE: CPT | Performed by: INTERNAL MEDICINE

## 2024-08-14 PROCEDURE — 2500000001 HC RX 250 WO HCPCS SELF ADMINISTERED DRUGS (ALT 637 FOR MEDICARE OP)

## 2024-08-14 PROCEDURE — 71046 X-RAY EXAM CHEST 2 VIEWS: CPT

## 2024-08-14 PROCEDURE — 85025 COMPLETE CBC W/AUTO DIFF WBC: CPT | Performed by: INTERNAL MEDICINE

## 2024-08-14 PROCEDURE — 70547 MR ANGIOGRAPHY NECK W/O DYE: CPT | Performed by: RADIOLOGY

## 2024-08-14 PROCEDURE — 97165 OT EVAL LOW COMPLEX 30 MIN: CPT | Mod: GO

## 2024-08-14 RX ORDER — LORAZEPAM 0.5 MG/1
0.5 TABLET ORAL ONCE
Status: COMPLETED | OUTPATIENT
Start: 2024-08-14 | End: 2024-08-14

## 2024-08-14 SDOH — ECONOMIC STABILITY: TRANSPORTATION INSECURITY
IN THE PAST 12 MONTHS, HAS THE LACK OF TRANSPORTATION KEPT YOU FROM MEDICAL APPOINTMENTS OR FROM GETTING MEDICATIONS?: NO

## 2024-08-14 SDOH — HEALTH STABILITY: MENTAL HEALTH
HOW OFTEN DO YOU NEED TO HAVE SOMEONE HELP YOU WHEN YOU READ INSTRUCTIONS, PAMPHLETS, OR OTHER WRITTEN MATERIAL FROM YOUR DOCTOR OR PHARMACY?: RARELY

## 2024-08-14 SDOH — SOCIAL STABILITY: SOCIAL INSECURITY: ARE THERE ANY APPARENT SIGNS OF INJURIES/BEHAVIORS THAT COULD BE RELATED TO ABUSE/NEGLECT?: NO

## 2024-08-14 SDOH — SOCIAL STABILITY: SOCIAL NETWORK
IN A TYPICAL WEEK, HOW MANY TIMES DO YOU TALK ON THE PHONE WITH FAMILY, FRIENDS, OR NEIGHBORS?: MORE THAN THREE TIMES A WEEK

## 2024-08-14 SDOH — ECONOMIC STABILITY: INCOME INSECURITY: IN THE LAST 12 MONTHS, WAS THERE A TIME WHEN YOU WERE NOT ABLE TO PAY THE MORTGAGE OR RENT ON TIME?: NO

## 2024-08-14 SDOH — SOCIAL STABILITY: SOCIAL INSECURITY: DO YOU FEEL ANYONE HAS EXPLOITED OR TAKEN ADVANTAGE OF YOU FINANCIALLY OR OF YOUR PERSONAL PROPERTY?: NO

## 2024-08-14 SDOH — ECONOMIC STABILITY: HOUSING INSECURITY: AT ANY TIME IN THE PAST 12 MONTHS, WERE YOU HOMELESS OR LIVING IN A SHELTER (INCLUDING NOW)?: NO

## 2024-08-14 SDOH — SOCIAL STABILITY: SOCIAL INSECURITY: ABUSE: ADULT

## 2024-08-14 SDOH — ECONOMIC STABILITY: INCOME INSECURITY: IN THE PAST 12 MONTHS, HAS THE ELECTRIC, GAS, OIL, OR WATER COMPANY THREATENED TO SHUT OFF SERVICE IN YOUR HOME?: NO

## 2024-08-14 SDOH — ECONOMIC STABILITY: FOOD INSECURITY: WITHIN THE PAST 12 MONTHS, YOU WORRIED THAT YOUR FOOD WOULD RUN OUT BEFORE YOU GOT MONEY TO BUY MORE.: NEVER TRUE

## 2024-08-14 SDOH — ECONOMIC STABILITY: FOOD INSECURITY: WITHIN THE PAST 12 MONTHS, THE FOOD YOU BOUGHT JUST DIDN'T LAST AND YOU DIDN'T HAVE MONEY TO GET MORE.: NEVER TRUE

## 2024-08-14 SDOH — ECONOMIC STABILITY: TRANSPORTATION INSECURITY
IN THE PAST 12 MONTHS, HAS LACK OF TRANSPORTATION KEPT YOU FROM MEETINGS, WORK, OR FROM GETTING THINGS NEEDED FOR DAILY LIVING?: NO

## 2024-08-14 SDOH — HEALTH STABILITY: MENTAL HEALTH
STRESS IS WHEN SOMEONE FEELS TENSE, NERVOUS, ANXIOUS, OR CAN'T SLEEP AT NIGHT BECAUSE THEIR MIND IS TROUBLED. HOW STRESSED ARE YOU?: NOT AT ALL

## 2024-08-14 SDOH — SOCIAL STABILITY: SOCIAL INSECURITY: HAS ANYONE EVER THREATENED TO HURT YOUR FAMILY OR YOUR PETS?: NO

## 2024-08-14 SDOH — SOCIAL STABILITY: SOCIAL INSECURITY: WERE YOU ABLE TO COMPLETE ALL THE BEHAVIORAL HEALTH SCREENINGS?: YES

## 2024-08-14 SDOH — SOCIAL STABILITY: SOCIAL INSECURITY: DOES ANYONE TRY TO KEEP YOU FROM HAVING/CONTACTING OTHER FRIENDS OR DOING THINGS OUTSIDE YOUR HOME?: NO

## 2024-08-14 SDOH — ECONOMIC STABILITY: INCOME INSECURITY: HOW HARD IS IT FOR YOU TO PAY FOR THE VERY BASICS LIKE FOOD, HOUSING, MEDICAL CARE, AND HEATING?: NOT HARD AT ALL

## 2024-08-14 SDOH — SOCIAL STABILITY: SOCIAL INSECURITY: ARE YOU OR HAVE YOU BEEN THREATENED OR ABUSED PHYSICALLY, EMOTIONALLY, OR SEXUALLY BY ANYONE?: NO

## 2024-08-14 SDOH — SOCIAL STABILITY: SOCIAL NETWORK: HOW OFTEN DO YOU GET TOGETHER WITH FRIENDS OR RELATIVES?: MORE THAN THREE TIMES A WEEK

## 2024-08-14 SDOH — SOCIAL STABILITY: SOCIAL INSECURITY: HAVE YOU HAD THOUGHTS OF HARMING ANYONE ELSE?: NO

## 2024-08-14 SDOH — SOCIAL STABILITY: SOCIAL INSECURITY: DO YOU FEEL UNSAFE GOING BACK TO THE PLACE WHERE YOU ARE LIVING?: NO

## 2024-08-14 ASSESSMENT — COGNITIVE AND FUNCTIONAL STATUS - GENERAL
MOBILITY SCORE: 17
TURNING FROM BACK TO SIDE WHILE IN FLAT BAD: A LITTLE
CLIMB 3 TO 5 STEPS WITH RAILING: A LOT
TURNING FROM BACK TO SIDE WHILE IN FLAT BAD: A LITTLE
MOBILITY SCORE: 18
DRESSING REGULAR UPPER BODY CLOTHING: A LITTLE
DRESSING REGULAR UPPER BODY CLOTHING: A LITTLE
MOVING FROM LYING ON BACK TO SITTING ON SIDE OF FLAT BED WITH BEDRAILS: A LITTLE
MOVING TO AND FROM BED TO CHAIR: A LITTLE
HELP NEEDED FOR BATHING: A LITTLE
HELP NEEDED FOR BATHING: A LITTLE
CLIMB 3 TO 5 STEPS WITH RAILING: A LITTLE
STANDING UP FROM CHAIR USING ARMS: A LITTLE
MOVING TO AND FROM BED TO CHAIR: A LITTLE
WALKING IN HOSPITAL ROOM: A LITTLE
CLIMB 3 TO 5 STEPS WITH RAILING: A LOT
DAILY ACTIVITIY SCORE: 17
TOILETING: A LITTLE
DRESSING REGULAR UPPER BODY CLOTHING: A LITTLE
WALKING IN HOSPITAL ROOM: A LITTLE
PERSONAL GROOMING: A LITTLE
DAILY ACTIVITIY SCORE: 19
TOILETING: A LITTLE
PERSONAL GROOMING: A LITTLE
HELP NEEDED FOR BATHING: A LITTLE
MOVING FROM LYING ON BACK TO SITTING ON SIDE OF FLAT BED WITH BEDRAILS: A LITTLE
STANDING UP FROM CHAIR USING ARMS: A LITTLE
MOVING TO AND FROM BED TO CHAIR: A LITTLE
PATIENT BASELINE BEDBOUND: NO
TOILETING: A LITTLE
DRESSING REGULAR LOWER BODY CLOTHING: A LITTLE
DRESSING REGULAR LOWER BODY CLOTHING: A LOT
WALKING IN HOSPITAL ROOM: A LITTLE
MOBILITY SCORE: 17
EATING MEALS: A LITTLE
DAILY ACTIVITIY SCORE: 17
TURNING FROM BACK TO SIDE WHILE IN FLAT BAD: A LITTLE
EATING MEALS: A LITTLE
DRESSING REGULAR LOWER BODY CLOTHING: A LOT
MOVING FROM LYING ON BACK TO SITTING ON SIDE OF FLAT BED WITH BEDRAILS: A LITTLE
STANDING UP FROM CHAIR USING ARMS: A LITTLE
PERSONAL GROOMING: A LITTLE

## 2024-08-14 ASSESSMENT — LIFESTYLE VARIABLES
AUDIT-C TOTAL SCORE: 2
HOW MANY STANDARD DRINKS CONTAINING ALCOHOL DO YOU HAVE ON A TYPICAL DAY: 1 OR 2
HOW OFTEN DO YOU HAVE 6 OR MORE DRINKS ON ONE OCCASION: NEVER
SKIP TO QUESTIONS 9-10: 1
HOW OFTEN DO YOU HAVE A DRINK CONTAINING ALCOHOL: 2-4 TIMES A MONTH
AUDIT-C TOTAL SCORE: 2

## 2024-08-14 ASSESSMENT — PAIN SCALES - GENERAL
PAINLEVEL_OUTOF10: 3
PAINLEVEL_OUTOF10: 0 - NO PAIN

## 2024-08-14 ASSESSMENT — PATIENT HEALTH QUESTIONNAIRE - PHQ9
SUM OF ALL RESPONSES TO PHQ9 QUESTIONS 1 & 2: 0
1. LITTLE INTEREST OR PLEASURE IN DOING THINGS: NOT AT ALL
2. FEELING DOWN, DEPRESSED OR HOPELESS: NOT AT ALL

## 2024-08-14 ASSESSMENT — ACTIVITIES OF DAILY LIVING (ADL)
DRESSING YOURSELF: INDEPENDENT
ASSISTIVE_DEVICE: WALKER
ADEQUATE_TO_COMPLETE_ADL: YES
ADL_ASSISTANCE: INDEPENDENT
FEEDING YOURSELF: INDEPENDENT
BATHING: INDEPENDENT
JUDGMENT_ADEQUATE_SAFELY_COMPLETE_DAILY_ACTIVITIES: YES
TOILETING: INDEPENDENT
PATIENT'S MEMORY ADEQUATE TO SAFELY COMPLETE DAILY ACTIVITIES?: YES
LACK_OF_TRANSPORTATION: NO
WALKS IN HOME: INDEPENDENT
ADL_ASSISTANCE: INDEPENDENT
LACK_OF_TRANSPORTATION: NO
HEARING - RIGHT EAR: FUNCTIONAL
HEARING - LEFT EAR: FUNCTIONAL
GROOMING: INDEPENDENT

## 2024-08-14 ASSESSMENT — PAIN - FUNCTIONAL ASSESSMENT
PAIN_FUNCTIONAL_ASSESSMENT: 0-10

## 2024-08-14 ASSESSMENT — ENCOUNTER SYMPTOMS: DIZZINESS: 1

## 2024-08-14 NOTE — CONSULTS
INITIAL NEUROLOGY CONSULT NOTE    IMPRESSION:  Vasovagal presyncope in the context of difficulty swallowing.  Presentation not suggestive of stroke/TIA or other acute neurological process.    RECOMMENDATIONS:  No acute neurological intervention required at this time.  I am happy to see the patient again as needed or requested.     Andre Glasgow Jr., M.D., FAAN       History Of Present Illness  Thania Christian is a 84 y.o. female presenting with presyncope.  History comes from the patient.    Yesterday, while eating, food became stuck in her throat.  When she reached for water, she became severely lightheaded, dyspneic, weak all over, and had a sensation of heat of the head and upper body with diaphoresis.  She denies associated chest pain/pressure or palpitations.  The episode lasted around a minute.  She was by herself when this happened and she was able to press a call button to summon help.  She was sent to the [facility] ED and admitted for further evaluation.  She separately notes episodic pain radiating down the left leg.  She has chronic dysphagia.  She currently denies other focal neurological symptoms including dysarthria, diplopia, focal weakness, focal sensory change, ataxia, vertigo, or bowel/bladder incontinence, among others, at the time of this event.     Past Medical History  Past Medical History:   Diagnosis Date    Encounter for general adult medical examination without abnormal findings 09/30/2019    Encounter for preventive health examination    Essential (primary) hypertension 09/28/2018    Benign essential hypertension    Personal history of other diseases of the nervous system and sense organs 07/22/2020    History of trigeminal neuralgia    Personal history of other specified conditions 07/22/2020    History of facial pain    Unspecified cataract     Cataracts, both eyes     Surgical History  Past Surgical History:   Procedure Laterality Date    MR HEAD ANGIO WO IV CONTRAST  11/20/2020      HEAD ANGIO WO IV CONTRAST 11/20/2020 [facility] ANCILLARY LEGACY     Social History  Social History     Tobacco Use    Smoking status: Never    Smokeless tobacco: Never   Substance Use Topics    Alcohol use: Yes     Alcohol/week: 2.0 standard drinks of alcohol     Types: 2 Standard drinks or equivalent per week     Comment: has happy hour on tues    Drug use: Never       Scheduled medications  aspirin, 81 mg, oral, Daily  atorvastatin, 80 mg, oral, Nightly  barium sulfate, , ,   enoxaparin, 40 mg, subcutaneous, q24h  escitalopram, 10 mg, oral, Daily  furosemide, 20 mg, oral, Daily  gabapentin, 100 mg, oral, TID  LORazepam, 0.5 mg, oral, Once  losartan, 50 mg, oral, Daily  morphine, 4 mg, intravenous, Once  pantoprazole, 40 mg, oral, Daily before breakfast   Or  pantoprazole, 40 mg, intravenous, Daily before breakfast  perflutren lipid microspheres, 0.5-10 mL of dilution, intravenous, Once in imaging  perflutren protein A microsphere, 0.5 mL, intravenous, Once in imaging  psyllium, 1 packet, oral, Daily  sulfur hexafluoride microsphr, 2 mL, intravenous, Once in imaging      Continuous medications     PRN medications  PRN medications: acetaminophen **OR** acetaminophen **OR** acetaminophen, barium sulfate, guaiFENesin, hydrALAZINE **FOLLOWED BY** [START ON 8/15/2024] hydrALAZINE, melatonin, ondansetron **OR** ondansetron, oxygen, polyethylene glycol      No family history on file.  Allergies  Amoxicillin  Medications Prior to Admission   Medication Sig Dispense Refill Last Dose    atenolol (Tenormin) 50 mg tablet Take 1 tablet (50 mg) by mouth 2 times a day. 180 tablet 0 8/13/2024    biotin 10 mg tablet Take by mouth once daily.   8/13/2024    calcium carbonate (Tums) 200 mg calcium chewable tablet Chew 2 tablets (1,000 mg) 4 times a day as needed.   Past Week    escitalopram (Lexapro) 10 mg tablet Take 1 tablet (10 mg) by mouth once daily. 90 tablet 0 8/13/2024    furosemide (Lasix) 20 mg tablet Take 1 tablet (20 mg) by  mouth once daily. 90 tablet 0 8/13/2024    gabapentin (Neurontin) 100 mg capsule Take 1 capsule (100 mg) by mouth 3 times a day. 270 capsule 0 8/13/2024    losartan (Cozaar) 50 mg tablet Take 1 tablet (50 mg) by mouth once daily. 90 tablet 1 8/13/2024    naproxen (Naprosyn) 500 mg tablet Take 1 tablet (500 mg) by mouth once daily as needed for mild pain (1 - 3). 90 tablet 0 8/13/2024    pantoprazole (ProtoNix) 40 mg EC tablet Take 1 tablet (40 mg) by mouth once daily. Do not crush, chew, or split. 30 tablet 2 8/13/2024    rosuvastatin (Crestor) 10 mg tablet Take 1 tablet (10 mg) by mouth once daily. 90 tablet 0 8/13/2024    crisaborole (Eucrisa) 2 % ointment Apply to affected area twice daily (Patient not taking: Reported on 8/14/2024) 100 g 0 More than a month       Scheduled medications  aspirin, 81 mg, oral, Daily  atorvastatin, 80 mg, oral, Nightly  barium sulfate, , ,   enoxaparin, 40 mg, subcutaneous, q24h  escitalopram, 10 mg, oral, Daily  furosemide, 20 mg, oral, Daily  gabapentin, 100 mg, oral, TID  LORazepam, 0.5 mg, oral, Once  losartan, 50 mg, oral, Daily  morphine, 4 mg, intravenous, Once  pantoprazole, 40 mg, oral, Daily before breakfast   Or  pantoprazole, 40 mg, intravenous, Daily before breakfast  perflutren lipid microspheres, 0.5-10 mL of dilution, intravenous, Once in imaging  perflutren protein A microsphere, 0.5 mL, intravenous, Once in imaging  psyllium, 1 packet, oral, Daily  sulfur hexafluoride microsphr, 2 mL, intravenous, Once in imaging      Continuous medications     PRN medications  PRN medications: acetaminophen **OR** acetaminophen **OR** acetaminophen, barium sulfate, guaiFENesin, hydrALAZINE **FOLLOWED BY** [START ON 8/15/2024] hydrALAZINE, melatonin, ondansetron **OR** ondansetron, oxygen, polyethylene glycol    Review of Systems   All other systems reviewed and are negative.      Last Recorded Vitals  Blood pressure 158/63, pulse 54, temperature 36.5 °C (97.7 °F), temperature  "source Oral, resp. rate 18, height 1.473 m (4' 10\"), weight 83.9 kg (185 lb), SpO2 100%.    CONSTITUTIONAL:  No acute distress    CARDIOVASCULAR:  Normal pulses in the distal legs, no edema of either arm or either leg.  No carotid bruits.    MENTAL STATUS:  Awake, alert, fully oriented to self, place, and time, with present short-term memory, good awareness of recent events, normal attention span, concentration, and fund of knowledge.    SPEECH AND LANGUAGE:  Can name and repeat, follows all commands, has no dysarthria    FUNDOSCOPIC:  No papilledema    CRANIAL NERVES:  II-Vision present, visual fields full to confrontational testing    III/IV/VI--EOMs are present in all directions.  Pupils are symmetrically reactive in dim light.  No ptosis.    V--Normal facial sensation.    VII--No facial asymmetry.    VIII--Hearing present to voice bilaterally.    IX/X--Symmetric soft palate rise.    XI--Normal trapezius power bilaterally.    XII--Tongue protrudes without deviation.    MOTOR:  Normal power, tone, and bulk in both arms and both legs.    SENSORY:  Reduced pin sensation in the left median and lateral shin, and the dorsum of the left foot.  Otherwise normal pin sensation in both arms and both legs without distal-proximal gradient, other asymmetry, or spinal sensory level.    COORDINATION:  Normal finger-to-nose and heel-to-shin testing in both arms and both legs.    REFLEXES are normal and symmetric at the biceps, triceps, brachioradialis, patella, and ankle.  The plantar responses are flexor.    GAIT is antalgic due to joint pain, but otherwise normal, without steppage, ataxia, shuffling, or spasticity.    Relevant Results  Results for orders placed or performed during the hospital encounter of 08/13/24 (from the past 24 hour(s))   POCT GLUCOSE   Result Value Ref Range    POCT Glucose 120 (H) 74 - 99 mg/dL   CBC and Auto Differential   Result Value Ref Range    WBC 7.8 4.4 - 11.3 x10*3/uL    nRBC 0.0 0.0 - 0.0 /100 " WBCs    RBC 4.25 4.00 - 5.20 x10*6/uL    Hemoglobin 12.3 12.0 - 16.0 g/dL    Hematocrit 37.3 36.0 - 46.0 %    MCV 88 80 - 100 fL    MCH 28.9 26.0 - 34.0 pg    MCHC 33.0 32.0 - 36.0 g/dL    RDW 12.5 11.5 - 14.5 %    Platelets 248 150 - 450 x10*3/uL    Neutrophils % 52.1 40.0 - 80.0 %    Immature Granulocytes %, Automated 0.5 0.0 - 0.9 %    Lymphocytes % 29.7 13.0 - 44.0 %    Monocytes % 14.1 2.0 - 10.0 %    Eosinophils % 3.0 0.0 - 6.0 %    Basophils % 0.6 0.0 - 2.0 %    Neutrophils Absolute 4.05 1.60 - 5.50 x10*3/uL    Immature Granulocytes Absolute, Automated 0.04 0.00 - 0.50 x10*3/uL    Lymphocytes Absolute 2.31 0.80 - 3.00 x10*3/uL    Monocytes Absolute 1.10 (H) 0.05 - 0.80 x10*3/uL    Eosinophils Absolute 0.23 0.00 - 0.40 x10*3/uL    Basophils Absolute 0.05 0.00 - 0.10 x10*3/uL   Comprehensive metabolic panel   Result Value Ref Range    Glucose 74 74 - 99 mg/dL    Sodium 146 (H) 136 - 145 mmol/L    Potassium 2.5 (LL) 3.5 - 5.3 mmol/L    Chloride 121 (H) 98 - 107 mmol/L    Bicarbonate 18 (L) 21 - 32 mmol/L    Anion Gap 10 10 - 20 mmol/L    Urea Nitrogen 13 6 - 23 mg/dL    Creatinine 0.53 0.50 - 1.05 mg/dL    eGFR >90 >60 mL/min/1.73m*2    Calcium 5.1 (LL) 8.6 - 10.3 mg/dL    Albumin 2.2 (L) 3.4 - 5.0 g/dL    Alkaline Phosphatase 35 33 - 136 U/L    Total Protein 3.9 (L) 6.4 - 8.2 g/dL    AST 13 9 - 39 U/L    Bilirubin, Total 0.4 0.0 - 1.2 mg/dL    ALT 20 7 - 45 U/L   Troponin I, High Sensitivity   Result Value Ref Range    Troponin I, High Sensitivity 5 0 - 13 ng/L   Protime-INR   Result Value Ref Range    Protime 11.9 9.8 - 12.8 seconds    INR 1.1 0.9 - 1.1   APTT   Result Value Ref Range    aPTT 26 (L) 27 - 38 seconds   ECG 12 lead   Result Value Ref Range    Ventricular Rate 66 BPM    Atrial Rate 66 BPM    IA Interval 234 ms    QRS Duration 68 ms    QT Interval 402 ms    QTC Calculation(Bazett) 421 ms    P Axis 38 degrees    R Axis -38 degrees    T Axis 53 degrees    QRS Count 11 beats    Q Onset 221 ms    P  Onset 104 ms    P Offset 153 ms    T Offset 422 ms    QTC Fredericia 415 ms   Basic metabolic panel   Result Value Ref Range    Glucose 91 74 - 99 mg/dL    Sodium 140 136 - 145 mmol/L    Potassium 3.5 3.5 - 5.3 mmol/L    Chloride 107 98 - 107 mmol/L    Bicarbonate 27 21 - 32 mmol/L    Anion Gap 10 10 - 20 mmol/L    Urea Nitrogen 19 6 - 23 mg/dL    Creatinine 0.88 0.50 - 1.05 mg/dL    eGFR 64 >60 mL/min/1.73m*2    Calcium 8.0 (L) 8.6 - 10.3 mg/dL   Urinalysis with Reflex Microscopic   Result Value Ref Range    Color, Urine Colorless (N) Light-Yellow, Yellow, Dark-Yellow    Appearance, Urine Clear Clear    Specific Gravity, Urine 1.008 1.005 - 1.035    pH, Urine 5.0 5.0, 5.5, 6.0, 6.5, 7.0, 7.5, 8.0    Protein, Urine NEGATIVE NEGATIVE, 10 (TRACE), 20 (TRACE) mg/dL    Glucose, Urine Normal Normal mg/dL    Blood, Urine NEGATIVE NEGATIVE    Ketones, Urine NEGATIVE NEGATIVE mg/dL    Bilirubin, Urine NEGATIVE NEGATIVE    Urobilinogen, Urine Normal Normal mg/dL    Nitrite, Urine NEGATIVE NEGATIVE    Leukocyte Esterase, Urine NEGATIVE NEGATIVE   Lipid Panel   Result Value Ref Range    Cholesterol 127 0 - 199 mg/dL    HDL-Cholesterol 35.1 mg/dL    Cholesterol/HDL Ratio 3.6     LDL Calculated 75 <=99 mg/dL    VLDL 17 0 - 40 mg/dL    Triglycerides 87 0 - 149 mg/dL    Non HDL Cholesterol 92 0 - 149 mg/dL   Hemoglobin A1C   Result Value Ref Range    Hemoglobin A1C 5.4 see below %    Estimated Average Glucose 108 Not Established mg/dL   B-Type Natriuretic Peptide   Result Value Ref Range     (H) 0 - 99 pg/mL   TSH with reflex to Free T4 if abnormal   Result Value Ref Range    Thyroid Stimulating Hormone 6.54 (H) 0.44 - 3.98 mIU/L   Basic metabolic panel   Result Value Ref Range    Glucose 83 74 - 99 mg/dL    Sodium 140 136 - 145 mmol/L    Potassium 4.0 3.5 - 5.3 mmol/L    Chloride 106 98 - 107 mmol/L    Bicarbonate 26 21 - 32 mmol/L    Anion Gap 12 10 - 20 mmol/L    Urea Nitrogen 19 6 - 23 mg/dL    Creatinine 0.93 0.50 -  1.05 mg/dL    eGFR 60 (L) >60 mL/min/1.73m*2    Calcium 8.2 (L) 8.6 - 10.3 mg/dL   CBC   Result Value Ref Range    WBC 7.6 4.4 - 11.3 x10*3/uL    nRBC 0.0 0.0 - 0.0 /100 WBCs    RBC 3.79 (L) 4.00 - 5.20 x10*6/uL    Hemoglobin 10.8 (L) 12.0 - 16.0 g/dL    Hematocrit 33.4 (L) 36.0 - 46.0 %    MCV 88 80 - 100 fL    MCH 28.5 26.0 - 34.0 pg    MCHC 32.3 32.0 - 36.0 g/dL    RDW 12.5 11.5 - 14.5 %    Platelets 224 150 - 450 x10*3/uL   Thyroxine, Free   Result Value Ref Range    Thyroxine, Free 0.88 0.61 - 1.12 ng/dL   Urinalysis with Reflex Microscopic   Result Value Ref Range    Color, Urine Light-Yellow Light-Yellow, Yellow, Dark-Yellow    Appearance, Urine Clear Clear    Specific Gravity, Urine 1.041 (N) 1.005 - 1.035    pH, Urine 5.5 5.0, 5.5, 6.0, 6.5, 7.0, 7.5, 8.0    Protein, Urine NEGATIVE NEGATIVE, 10 (TRACE), 20 (TRACE) mg/dL    Glucose, Urine Normal Normal mg/dL    Blood, Urine NEGATIVE NEGATIVE    Ketones, Urine NEGATIVE NEGATIVE mg/dL    Bilirubin, Urine NEGATIVE NEGATIVE    Urobilinogen, Urine Normal Normal mg/dL    Nitrite, Urine NEGATIVE NEGATIVE    Leukocyte Esterase, Urine 25 Mayra/µL (A) NEGATIVE   Microscopic Only, Urine   Result Value Ref Range    WBC, Urine 1-5 1-5, NONE /HPF    RBC, Urine NONE NONE, 1-2, 3-5 /HPF    Squamous Epithelial Cells, Urine 1-9 (SPARSE) Reference range not established. /HPF    Bacteria, Urine 2+ (A) NONE SEEN /HPF   Basic Metabolic Panel   Result Value Ref Range    Glucose 96 74 - 99 mg/dL    Sodium 142 136 - 145 mmol/L    Potassium 4.1 3.5 - 5.3 mmol/L    Chloride 106 98 - 107 mmol/L    Bicarbonate 27 21 - 32 mmol/L    Anion Gap 13 10 - 20 mmol/L    Urea Nitrogen 19 6 - 23 mg/dL    Creatinine 0.94 0.50 - 1.05 mg/dL    eGFR 60 (L) >60 mL/min/1.73m*2    Calcium 8.5 (L) 8.6 - 10.3 mg/dL   CBC and Auto Differential   Result Value Ref Range    WBC 6.2 4.4 - 11.3 x10*3/uL    nRBC 0.0 0.0 - 0.0 /100 WBCs    RBC 4.09 4.00 - 5.20 x10*6/uL    Hemoglobin 11.7 (L) 12.0 - 16.0 g/dL     Hematocrit 37.7 36.0 - 46.0 %    MCV 92 80 - 100 fL    MCH 28.6 26.0 - 34.0 pg    MCHC 31.0 (L) 32.0 - 36.0 g/dL    RDW 12.4 11.5 - 14.5 %    Platelets 214 150 - 450 x10*3/uL    Neutrophils % 48.9 40.0 - 80.0 %    Immature Granulocytes %, Automated 0.3 0.0 - 0.9 %    Lymphocytes % 31.5 13.0 - 44.0 %    Monocytes % 14.8 2.0 - 10.0 %    Eosinophils % 3.9 0.0 - 6.0 %    Basophils % 0.6 0.0 - 2.0 %    Neutrophils Absolute 3.03 1.60 - 5.50 x10*3/uL    Immature Granulocytes Absolute, Automated 0.02 0.00 - 0.50 x10*3/uL    Lymphocytes Absolute 1.95 0.80 - 3.00 x10*3/uL    Monocytes Absolute 0.92 (H) 0.05 - 0.80 x10*3/uL    Eosinophils Absolute 0.24 0.00 - 0.40 x10*3/uL    Basophils Absolute 0.04 0.00 - 0.10 x10*3/uL   T4, free   Result Value Ref Range    Thyroxine, Free 0.99 0.61 - 1.12 ng/dL         I have personally reviewed the following imaging results:    CT BRAIN ATTACK ANGIO HEAD AND NECK W AND WO IV CONTRAST;  8/13/2024  3:26 pm      INDICATION:  Signs/Symptoms:Ataxia, headache, left sided weakness, LKW 2am, NIHSS  6.      COMPARISON:  Unenhanced head CT from the same date but reported separately.      ACCESSION NUMBER(S):  [id number]      ORDERING CLINICIAN:  SHARMILA MONTGOMERY      TECHNIQUE:  75 mL Omnipaque 350 was administered intravenously and axial images  of the head and neck were acquired.  Coronal, sagittal, and 3-D  reconstructions were provided for review. 3D reconstructions were  performed on an independent workstation.      FINDINGS:          CTA HEAD FINDINGS: The volumetric reconstructions are suboptimal.      Anterior circulation: There are diffuse calcifications of the  intracranial internal carotid arteries. Artifact associated with the  calcification limits assessment of the degree of luminal narrowing.  There is likely at least moderate narrowing of the ophthalmic and  proximal communicating segments. The A1 segment of the right SUNIL is  diminutive in caliber relative to the left which could  reflect  developmental variation. The proximal ACAs are otherwise patent.  There is multifocal irregularity, narrowing, and attenuation of flow  related signal involving bilateral MCAs and A2 and more distal SUNIL  branches.      Posterior circulation: The V4 segments of the vertebral arteries and  the basilar artery are patent. There is irregularity and narrowing of  P1 segments of the PCAs, left greater than right.      CTA NECK FINDINGS:      The aortic arch and arch vessels are degraded by artifact. There is a  three-vessel arch. There are atherosclerotic changes of the aortic  arch and also involving the origins of the arch vessels and scattered  along bilateral subclavian arteries.      Carotid vessels:      The proximal common carotid arteries are degraded by artifact. There  appears to be at least moderate narrowing of the origin of the left  common carotid artery. There is scattered atherosclerotic plaque  without measurable luminal narrowing. There are atherosclerotic  changes of the carotid bifurcations and proximal ICAs. Relative to  the normal caliber of the vessel on the left there is no measurable  stenosis. There is tortuosity of the left cervical ICA. The cervical  right ICA is also tortuous and somewhat ectatic. Portions of the  vessel are markedly degraded by streak artifact. Relative to the  normal caliber of the vessel more distally luminal narrowing is  thought to be at most 30%. There is luminal irregularity at the  bifurcation and origin of the ICA which could reflect a component of  atheromatous ulceration. Assessment of stenosis in the region of  vessel tortuosity is suboptimal due to streak artifact.      Vertebral vessels:      The V1 segments of the vertebral arteries are degraded by artifact.  There is atherosclerotic plaque at the origin of the right with mild  luminal narrowing suspected. There is both calcified and noncalcified  plaque along the V1 segment on the left which appears to  be mildly  narrowed. The distal V2 and V3 segments of the vertebral arteries are  degraded by artifact. There is likely at least mild narrowing due to  impression from degenerative changes of the cervical spine.      There is complete opacification of the left maxillary sinus and  several anterior ethmoid air cells on the left as well as partial  opacification of the left frontal sinus and left sphenoid sinus.  There is material in the middle meatus which is nonspecific and  correlation with direct visualization is recommended.      There is subtle asymmetric fullness of the oropharyngeal soft tissues  on the left. Correlation with direct visualization is recommended.      There are advanced, multilevel degenerative changes of the cervical  spine with associated central canal and neuroforaminal stenosis.              IMPRESSION:  1. Multifocal areas of irregularity and narrowing of the anterior and  posterior circulation without proximal large branch vessel cut off.  MRI with diffusion-weighted imaging would be a much more sensitive  means of assessing for acute ischemic injury.  2. Atherosclerotic changes of the carotid bifurcations and proximal  ICAs with mild narrowing on the right.  3. Additional findings as detailed above.      MACRO:  None      Signed by: Shelia Lamb 8/13/2024 3:50 PM  Dictation workstation:   [id number]         Andre Glasgow Jr., M.D., FAAN

## 2024-08-14 NOTE — CARE PLAN
Problem: Pain - Adult  Goal: Verbalizes/displays adequate comfort level or baseline comfort level  Outcome: Progressing     Problem: Safety - Adult  Goal: Free from fall injury  Outcome: Progressing     Problem: Discharge Planning  Goal: Discharge to home or other facility with appropriate resources  Outcome: Progressing     Problem: Chronic Conditions and Co-morbidities  Goal: Patient's chronic conditions and co-morbidity symptoms are monitored and maintained or improved  Outcome: Progressing     Problem: General Stroke  Goal: Establish a mutual long term goal with patient by discharge  Outcome: Progressing  Goal: Demonstrate improvement in neurological exam throughout the shift  Outcome: Progressing  Goal: Maintain BP within ordered limits throughout shift  Outcome: Progressing  Goal: Participate in treatment (ie., meds, therapy) throughout shift  Outcome: Progressing  Goal: No symptoms of aspiration throughout shift  Outcome: Progressing  Goal: No symptoms of hemorrhage throughout shift  Outcome: Progressing  Goal: Tolerate enteral feeding throughout shift  Outcome: Progressing  Goal: Decreased nausea/vomiting throughout shift  Outcome: Progressing  Goal: Controlled blood glucose throughout shift  Outcome: Progressing  Goal: Out of bed three times today  Outcome: Progressing     Problem: ICU Stroke  Goal: Maintain ICP within ordered limits throughout shift  Outcome: Progressing  Goal: Tolerate EVD clamping trial throughout shift  Outcome: Progressing  Goal: Tolerate ventilator weaning trial during shift  Outcome: Progressing  Goal: Maintain patent airway throughout shift  Outcome: Progressing  Goal: Achieve/maintain targeted sodium level throughout shift  Outcome: Progressing

## 2024-08-14 NOTE — PROGRESS NOTES
Speech-Language Pathology    Inpatient Speech-Language Pathology Clinical Swallow Evaluation    Patient Name: Thania Christian  MRN: [id number]  : 1940  Today's Date: 24   Time Calculation  Start Time: 1013  Stop Time: 1042  Time Calculation (min): 29 min        RECOMMENDATIONS:    Solid Diet Recommendations : Regular (IDDSI Level 7)  2.   Liquid Diet Recommendations: Thin (IDDSI Level 0)  3.   Medication Administration Recommendations: Whole, With Liquid  4.    NO FURTHER SLP SERVICES REQUIRED    Assessment:  Assessment Results: Patient fully alert and verbal with clear speech pattern and intact cognitive-linguistic skills at conversational level. No oral-motor weakness identified at time of assessment. Patient seated upright in bed for swallow assessment. Adequate bolus manipulation noted with no overt s/s aspiration detected with puree/solid and thin liquid boluses. Patient able to consume 3 oz water via cup with no overt difficulty. Patient denies difficulty with deglutition and language skills and reports functioning at baseline.   Patient reporting globus sensation. If this continues or worsens, would consider further esophageal testing. Noted order in system for esophagram.    Baseline Assessment:  Respiratory Status: Room air  History of Intubation: No        Behavior/Cognition: Alert, Cooperative  Patient Positioning: Upright in Bed  Baseline Vocal Quality: Normal    Oral-Motor Assessment:  Dentition: Adequate/Natural  Oral Motor: Within Functional Limits    Plan:  SLP Plan: No skilled SLP        SLP Discharge Recommendations: D/C as patient is functional for this level of care             Goals:   N/A    General Visit Information:  Patient admitted: 24    Past Medical History: hyperlipidemia, HTN, JENNA, thyroid nodule, hypothyroidism, COPD, Hyperglycemia, obesity, postnasal drainage, mild depression, CKD, gait disturbance. Globus sensation also reported.    Chief Complaint/Reason for  admission: Admitted with dry throat, head pain and dizziness.    Relevant Imaging Results: CT head >negative for acute event    Living Environment: Home  Reason for Referral: concern for CVA  Ordering Physician: Dr. Diop  Current Diet : regular/thin diet ordered    Pain:  Pain Assessment: 0-10  0-10 (Numeric) Pain Score: 3 (head)    Treatment:    N/A    Inpatient Education:  Patient educated on current swallow function  Patient education results: gave verbal understanding    Following completion of session:  *Bed alarm: On  *Bed position: Lowered

## 2024-08-14 NOTE — PROGRESS NOTES
08/14/24 1028   [facility] Disability Status   Are you deaf or do you have serious difficulty hearing? N   Are you blind or do you have serious difficulty seeing, even when wearing glasses? N   Because of a physical, mental, or emotional condition, do you have serious difficulty concentrating, remembering, or making decisions? (5 years old or older) N   Do you have serious difficulty walking or climbing stairs? N   Do you have serious difficulty dressing or bathing? N   Because of a physical, mental, or emotional condition, do you have serious difficulty doing errands alone such as visiting the doctor? N

## 2024-08-14 NOTE — PROGRESS NOTES
Pharmacy Medication History Review    Thania Christian is a 84 y.o. female admitted for Ac isch multi vasc territories stroke (Multi). Pharmacy reviewed the patient's [id number] medications and allergies for accuracy.    The list below reflectives the updated PTA list. Please review each medication in order reconciliation for additional clarification and justification.  Prior to Admission Medications   Prescriptions Last Dose Informant   atenolol (Tenormin) 50 mg tablet 8/13/2024 Self   Sig: Take 1 tablet (50 mg) by mouth 2 times a day.   biotin 10 mg tablet 8/13/2024 Self   Sig: Take by mouth once daily.   calcium carbonate (Tums) 200 mg calcium chewable tablet Past Week Self   Sig: Chew 2 tablets (1,000 mg) 4 times a day as needed.   escitalopram (Lexapro) 10 mg tablet 8/13/2024 Self   Sig: Take 1 tablet (10 mg) by mouth once daily.   furosemide (Lasix) 20 mg tablet 8/13/2024 Self   Sig: Take 1 tablet (20 mg) by mouth once daily.   gabapentin (Neurontin) 100 mg capsule 8/13/2024 Self   Sig: Take 1 capsule (100 mg) by mouth 3 times a day.   losartan (Cozaar) 50 mg tablet 8/13/2024 Self   Sig: Take 1 tablet (50 mg) by mouth once daily.   naproxen (Naprosyn) 500 mg tablet 8/13/2024 Self   Sig: Take 1 tablet (500 mg) by mouth once daily as needed for mild pain (1 - 3).   pantoprazole (ProtoNix) 40 mg EC tablet 8/13/2024 Self   Sig: Take 1 tablet (40 mg) by mouth once daily. Do not crush, chew, or split.   rosuvastatin (Crestor) 10 mg tablet 8/13/2024 Self   Sig: Take 1 tablet (10 mg) by mouth once daily.      Facility-Administered Medications: None         The list below reflectives the updated allergy list. Please review each documented allergy for additional clarification and justification.  Allergies  Reviewed by Emi Bennett CPhT on 8/14/2024        Severity Reactions Comments    Amoxicillin Not Specified Unknown             Below are additional concerns with the patient's PTA list.  The following updates  were made to the Prior to Admission medication list:     Source of Information:     Medications ADDED:   non  Medications CHANGED:  non  Medications REMOVED:   non  Medications NOT TAKING:   eucrisa    Allergy reviewed : Yes    Comments:       Emi Bennett CPhT

## 2024-08-14 NOTE — PROGRESS NOTES
08/14/24 1021   Discharge Planning   Living Arrangements Alone   Support Systems Children;Family members   Assistance Needed mostly independent   Type of Residence Private residence   Who is requesting discharge planning? Provider   Home or Post Acute Services In home services   Type of Home Care Services Home nursing visits;Home OT;Home PT   Expected Discharge Disposition  Services   Does the patient need discharge transport arranged? No   Financial Resource Strain   How hard is it for you to pay for the very basics like food, housing, medical care, and heating? Not hard   Housing Stability   In the last 12 months, was there a time when you were not able to pay the mortgage or rent on time? N   In the past 12 months, how many times have you moved where you were living? 0   At any time in the past 12 months, were you homeless or living in a shelter (including now)? N   Transportation Needs   In the past 12 months, has lack of transportation kept you from medical appointments or from getting medications? no   In the past 12 months, has lack of transportation kept you from meetings, work, or from getting things needed for daily living? No   Patient Choice   Provider Choice list and CMS website (https://medicare.gov/care-compare#search) for post-acute Quality and Resource Measure Data were provided and reviewed with: Patient     Met with patient at bedside  Explained role of TCC  Patient admitted for dizziness and difficulty swallowing    Patient lives in [facility] a [place] community  Uses a 3 wheeled walker sometimes  Does drive but has family to assist when not feeling well  Would like [facility] [place] at discharge  Confirmed pcp and pharmacy is correct in computer  Neuro consulted    ADOD 1-3 days  BARRIERS MRI, TTE, neuro recs  DISPO [facility] [place]

## 2024-08-14 NOTE — PROGRESS NOTES
Occupational Therapy    Evaluation    Patient Name: Thania Christian  MRN: [id number]  Today's Date: 8/14/2024  Time Calculation  Start Time: 1050  Stop Time: 1104  Time Calculation (min): 14 min        Assessment:  OT Assessment: Pt presents with decrease balance, endurance, strength and ROM, impeding ADL performance and functional mobility. Pt would benefit from skilled OT services to address these deficits and facilitate highest level of function.  Prognosis: Good  Evaluation/Treatment Tolerance: Patient limited by fatigue  Medical Staff Made Aware: Yes  End of Session Communication: Bedside nurse  End of Session Patient Position: Up in chair, Alarm on  OT Assessment Results: Decreased ADL status, Decreased upper extremity range of motion, Decreased upper extremity strength, Decreased endurance, Decreased fine motor control, Decreased functional mobility, Decreased IADLs  Prognosis: Good  Evaluation/Treatment Tolerance: Patient limited by fatigue  Medical Staff Made Aware: Yes  Strengths: Ability to acquire knowledge, Insight into problems, Living arrangement secure, Housing layout, Physical health, Support of Caregivers  Barriers to Participation: Comorbidities  Plan:  Treatment Interventions: ADL retraining, Functional transfer training, UE strengthening/ROM, Endurance training, Equipment evaluation/education, Fine motor coordination activities, Compensatory technique education  OT Frequency: 3 times per week  OT Discharge Recommendations: Low intensity level of continued care  Equipment Recommended upon Discharge: Wheeled walker  OT Recommended Transfer Status: Assist of 1  OT - OK to Discharge: Yes (Per POC)  Treatment Interventions: ADL retraining, Functional transfer training, UE strengthening/ROM, Endurance training, Equipment evaluation/education, Fine motor coordination activities, Compensatory technique education    Subjective     General:  General  Reason for Referral: Pt presenting due to dizziness and  difficulty swallowing ~2 days with suspicion of CVA pending MRI. Pt with PMHx of BUE residual weakness after fractures  Referred By: Giovana Diop MD  Past Medical History Relevant to Rehab:   Past Medical History:   Diagnosis Date    Encounter for general adult medical examination without abnormal findings 09/30/2019    Encounter for preventive health examination    Essential (primary) hypertension 09/28/2018    Benign essential hypertension    Personal history of other diseases of the nervous system and sense organs 07/22/2020    History of trigeminal neuralgia    Personal history of other specified conditions 07/22/2020    History of facial pain    Unspecified cataract     Cataracts, both eyes     Co-Treatment: PT  Co-Treatment Reason: for maximal pt safety and participation  Prior to Session Communication: Bedside nurse  Patient Position Received: Bed, 3 rail up, Alarm on  Preferred Learning Style: auditory, kinesthetic, verbal, visual  General Comment: Pt pleasant and agreeable to PT/OT evaluations. Pt limited by balance deficits  Precautions:  Medical Precautions: Fall precautions     Pain:  Pain Assessment  Pain Assessment: 0-10  0-10 (Numeric) Pain Score: 3  Pain Type: Acute pain  Pain Location: Head  Pain Interventions: Repositioned, Ambulation/increased activity    Objective   Cognition:  Overall Cognitive Status: Within Functional Limits  Orientation Level: Oriented X4  Attention: Within Functional Limits  Memory: Within Funtional Limits  Insight: Within function limits  Impulsive: Within functional limits           Home Living:  Type of Home: Independent living  Lives With: Alone  Home Adaptive Equipment: Cane (rollator)  Home Layout: One level  Home Access: Level entry  Bathroom Shower/Tub: Walk-in shower  Bathroom Toilet: Handicapped height  Bathroom Equipment: Grab bars in shower, Shower chair with back, Grab bars around toilet  Prior Function:  Level of [place]: Independent with ADLs and  functional transfers, Independent with homemaking with ambulation  Receives Help From:  ([facility] staff provides 3 meals per day and does housekeeping)  ADL Assistance: Independent  Homemaking Assistance: Needs assistance ([facility] staff provides meals and housekeeping. Pt does own laundry)  Ambulatory Assistance: Independent (no device in home; rollator to dining castro; rollator or SPC in community)  Prior Function Comments: (-) driving  IADL History:     ADL:  LE Dressing Assistance: Moderate  LE Dressing Deficit:  (Assist with threading BLE into briefs/ stadying)  Activity Tolerance:  Endurance: Tolerates 10 - 20 min exercise with multiple rests  Bed Mobility/Transfers: Bed Mobility  Bed Mobility: Yes  Bed Mobility 1  Bed Mobility 1: Supine to sitting  Level of Assistance 1: Close supervision  Bed Mobility Comments 1: Increased time to manage BLEs and trunk    Transfers  Transfer: Yes  Transfer 1  Transfer From 1: Sit to  Transfer to 1: Stand  Technique 1: Sit to stand, Stand to sit  Transfer Device 1: Walker  Transfer Level of Assistance 1: Contact guard  Trials/Comments 1: VCs for hand placement/sequencing      Functional Mobility:  Functional Mobility  Functional Mobility Performed: Yes  Functional Mobility 1  Surface 1: Level tile  Device 1: Rolling walker  Functional Mobility Support Devices: Gait belt  Assistance 1: Contact guard  Comments 1: Pt functionally navigated x min household distance with FWW, CGA due to decrease balance and unsteadiness.  Sitting Balance:  Static Sitting Balance  Static Sitting-Balance Support: Feet supported, Bilateral upper extremity supported  Static Sitting-Level of Assistance: Close supervision  Dynamic Sitting Balance  Dynamic Sitting-Balance Support: Feet supported, Bilateral upper extremity supported  Dynamic Sitting-Level of Assistance: Contact guard, Minimum assistance  Dynamic Sitting-Comments: During ADL tasks  Standing Balance:  Static Standing Balance  Static Standing-Balance  Support: Bilateral upper extremity supported  Static Standing-Level of Assistance: Contact guard  Dynamic Standing Balance  Dynamic Standing-Balance Support: Bilateral upper extremity supported  Dynamic Standing-Level of Assistance: Contact guard  Dynamic Standing-Balance: Turning   Vision:Vision - Basic Assessment  Current Vision: No visual deficits  Sensation:  Light Touch: No apparent deficits  Strength:  Strength Comments: Limited shoulder flexion     Coordination:  Movements are Fluid and Coordinated: Yes   Hand Function:  Gross Grasp: Functional  Coordination: Functional  Extremities: RUE   RUE : Exceptions to WFL (Pt reports deficits due to history of fracturing arm; greater than or equal to 3/5 distally) and LUE   LUE: Exceptions to WFL (Pt reports deficits due to history of fracturing arm; greater than or equal to 3/5 distally)    Outcome Measures:[facility] Daily Activity  Putting on and taking off regular lower body clothing: A lot  Bathing (including washing, rinsing, drying): A little  Putting on and taking off regular upper body clothing: A little  Toileting, which includes using toilet, bedpan or urinal: A little  Taking care of personal grooming such as brushing teeth: A little  Eating Meals: A little  Daily Activity - Total Score: 17        Education Documentation  Body Mechanics, taught by Rula Rocha OT at 8/14/2024 11:55 AM.  Learner: Patient  Readiness: Acceptance  Method: Explanation, Demonstration  Response: Verbalizes Understanding    ADL Training, taught by Rula Rocha OT at 8/14/2024 11:55 AM.  Learner: Patient  Readiness: Acceptance  Method: Explanation, Demonstration  Response: Verbalizes Understanding    Education Comments  No comments found.        OP EDUCATION:       Goals:  Encounter Problems       Encounter Problems (Active)       ADLs       Patient will perform UB and LB sponge bathing with stand by assist level of assistance edge of bed.       Start:  08/14/24    Expected End:   08/28/24            Patient with complete upper body dressing with stand by assist level of assistance donning and doffing all UE clothes with PRN adaptive equipment while edge of bed.       Start:  08/14/24    Expected End:  08/28/24            Patient with complete lower body dressing with contact guard assist level of assistance donning and doffing all LE clothes  with PRN adaptive equipment while edge of bed.       Start:  08/14/24    Expected End:  08/28/24            Patient will complete daily grooming tasks with supervision level of assistance and PRN adaptive equipment while standing.       Start:  08/14/24    Expected End:  08/28/24            Patient will complete toileting including hygiene clothing management/hygiene with supervision level of assistance and raised toilet seat and grab bars.       Start:  08/14/24    Expected End:  08/28/24               BALANCE       Pt will maintain dynamic standing balance during ADL task with supervision level of assistance in order to demonstrate decreased risk of falling and improved postural control.       Start:  08/14/24    Expected End:  08/28/24               MOBILITY       Patient will perform Functional mobility x Household distances/Community Distances with supervision level of assistance and least restrictive device in order to improve safety and functional mobility.       Start:  08/14/24    Expected End:  08/28/24               TRANSFERS       Patient will complete functional transfers with least restrictive device with modified independent level of assistance.       Start:  08/14/24    Expected End:  08/28/24

## 2024-08-14 NOTE — PROGRESS NOTES
Physical Therapy    Physical Therapy Evaluation    Patient Name: Thania Christian  MRN: [id number]  Today's Date: 8/14/2024   Time Calculation  Start Time: 1051  Stop Time: 1105  Time Calculation (min): 14 min    Assessment/Plan   PT Assessment  PT Assessment Results: Decreased strength, Decreased endurance, Impaired balance, Decreased mobility  Rehab Prognosis: Good  Evaluation/Treatment Tolerance: Patient tolerated treatment well  Medical Staff Made Aware: Yes  Strengths: Ability to acquire knowledge, Insight into problems, Living arrangement secure, Housing layout, Physical health, Support of Caregivers  Barriers to Participation: Comorbidities  End of Session Communication: Bedside nurse  Assessment Comment: Pt demonstrating deficits in generalized strength, endurance and balance as well as increased pain resulting in impaired functional mobility, gait and self care. Due to the impairments listed above, pt would benefit from skilled physical therapy services in acute care setting as well as additional therapy in LOW intensity setting with PRN assistance at DC  End of Session Patient Position: Up in chair, Alarm on  IP OR SWING BED PT PLAN  Inpatient or Swing Bed: Inpatient  PT Plan  Treatment/Interventions: Bed mobility, Transfer training, Gait training, Stair training, Balance training, Neuromuscular re-education, Strengthening, Endurance training, Therapeutic exercise, Therapeutic activity, Home exercise program  PT Plan: Ongoing PT  PT Frequency: 3 times per week  PT Discharge Recommendations: Low intensity level of continued care  Equipment Recommended upon Discharge: Wheeled walker  PT Recommended Transfer Status: Assist x1  PT - OK to Discharge: Yes (PT POC initiated this date)      Subjective   General Visit Information:  General  Reason for Referral: Pt presenting due to dizziness and difficulty swallowing ~2 days with suspicion of CVA pending MRI. Pt with PMHx of BUE residual weakness after  fractures  Referred By: Giovana Diop MD  Past Medical History Relevant to Rehab:   Past Medical History:   Diagnosis Date    Encounter for general adult medical examination without abnormal findings 09/30/2019    Encounter for preventive health examination    Essential (primary) hypertension 09/28/2018    Benign essential hypertension    Personal history of other diseases of the nervous system and sense organs 07/22/2020    History of trigeminal neuralgia    Personal history of other specified conditions 07/22/2020    History of facial pain    Unspecified cataract     Cataracts, both eyes       Co-Treatment: OT  Co-Treatment Reason: for maximal pt safety and participation  Prior to Session Communication: Bedside nurse  Patient Position Received: Bed, 3 rail up, Alarm on  General Comment: Pt pleasant and agreeable to PT/OT evaluations. Pt limited by balance deficits  Home Living:  Home Living  Type of Home: Independent living  Lives With: Alone  Home Adaptive Equipment: Cane (rollator)  Home Layout: One level  Home Access: Level entry  Bathroom Shower/Tub: Walk-in shower  Bathroom Toilet: Handicapped height  Bathroom Equipment: Grab bars in shower, Shower chair with back, Grab bars around toilet  Prior Level of Function:  Prior Function Per Pt/Caregiver Report  Level of [place]: Independent with ADLs and functional transfers, Independent with homemaking with ambulation  Receives Help From:  ([facility] staff provides 3 meals per day and does housekeeping)  ADL Assistance: Independent  Homemaking Assistance: Needs assistance ([facility] staff provides meals and housekeeping. Pt does own laundry)  Ambulatory Assistance: Independent (no device in home; rollator to dining castro; rollator or SPC in community)  Prior Function Comments: (-) driving  Precautions:  Precautions  Medical Precautions: Fall precautions  Vital Signs:       Objective   Pain:  Pain Assessment  Pain Assessment: 0-10  0-10 (Numeric) Pain Score: 3  Pain Type:  "Acute pain  Pain Location: Head  Cognition:  Cognition  Overall Cognitive Status: Within Functional Limits  Orientation Level: Oriented X4  Attention: Within Functional Limits  Memory: Within Funtional Limits  Insight: Within function limits  Impulsive: Within functional limits    General Assessments:  Activity Tolerance  Endurance: Tolerates 10 - 20 min exercise with multiple rests    Sensation  Light Touch: No apparent deficits    Coordination  Movements are Fluid and Coordinated: Yes    Static Sitting Balance  Static Sitting-Balance Support: No upper extremity supported, Feet supported  Static Sitting-Level of Assistance: Distant supervision  Static Sitting-Comment/Number of Minutes: 2 min    Static Standing Balance  Static Standing-Balance Support: No upper extremity supported (intermittent use of FWW as needed for balance)  Static Standing-Level of Assistance: Contact guard  Static Standing-Comment/Number of Minutes: 2 min  Dynamic Standing Balance  Dynamic Standing-Balance Support: No upper extremity supported  Dynamic Standing-Level of Assistance: Maximum assistance (Pt primarily required CGA to min A for balance during dynamic task such as pulling up depends however x1 episode of posterior LOB requiring max A to control descent into sitting position on bed. Pt states \"I just lost my balance\")  Dynamic Standing-Comments: 1 min  Functional Assessments:  Bed Mobility  Bed Mobility: Yes  Bed Mobility 1  Bed Mobility 1: Supine to sitting  Level of Assistance 1: Close supervision  Bed Mobility Comments 1: Increased time to manage BLEs and trunk    Transfers  Transfer: Yes  Transfer 1  Transfer From 1: Sit to  Transfer to 1: Stand  Technique 1: Sit to stand, Stand to sit  Transfer Device 1: Walker  Transfer Level of Assistance 1: Contact guard  Trials/Comments 1: VCs for hand placement/sequencing    Ambulation/Gait Training  Ambulation/Gait Training Performed: Yes  Ambulation/Gait Training 1  Surface 1: Level " tile  Device 1: Rolling walker  Gait Support Devices: Gait belt  Assistance 1: Contact guard  Quality of Gait 1:  (flexed trunk posture with reduced B step length and gait luz)  Comments/Distance (ft) 1: 20ft  Ambulation/Gait Training 2  Surface 2: Level tile  Device 2: No device  Gait Support Devices: Gait belt  Assistance 2: Minimum assistance  Quality of Gait 2:  (Very unsteady with WBOS and reduced B step length and excessive lateral trunk leans)  Comments/Distance (ft) 2: 1ft (forwards and retro)  Extremity/Trunk Assessments:  RLE   RLE : Exceptions to WFL (grossly 4/5)  LLE   LLE : Exceptions to WFL (grossly 4/5)  Outcome Measures:  [facility] Basic Mobility  Turning from your back to your side while in a flat bed without using bedrails: A little  Moving from lying on your back to sitting on the side of a flat bed without using bedrails: A little  Moving to and from bed to chair (including a wheelchair): A little  Standing up from a chair using your arms (e.g. wheelchair or bedside chair): A little  To walk in hospital room: A little  Climbing 3-5 steps with railing: A lot  Basic Mobility - Total Score: 17    Encounter Problems       Encounter Problems (Active)       Balance       LTG - Patient will tolerate standing       Start:  08/14/24    Expected End:  08/28/24       Pt will complete PARKER balance test and score >40/56 demonstrating low risk of falls            Mobility       STG - Patient will ambulate       Start:  08/14/24    Expected End:  08/28/24       >150ft using LRAD (FWW vs rollator)            PT Transfers       STG - Patient will perform bed mobility       Start:  08/14/24    Expected End:  08/28/24       Mod Ind         STG - Patient will transfer sit to and from stand       Start:  08/14/24    Expected End:  08/28/24       Mod Ind            Pain - Adult              Education Documentation  Precautions, taught by Delano Campbell, PT at 8/14/2024 11:41 AM.  Learner: Patient  Readiness:  Acceptance  Method: Explanation  Response: Verbalizes Understanding    Body Mechanics, taught by Delano Campbell, PT at 8/14/2024 11:41 AM.  Learner: Patient  Readiness: Acceptance  Method: Explanation  Response: Verbalizes Understanding    Mobility Training, taught by Delano Campbell, PT at 8/14/2024 11:41 AM.  Learner: Patient  Readiness: Acceptance  Method: Explanation  Response: Verbalizes Understanding    Education Comments  No comments found.

## 2024-08-14 NOTE — H&P
Thania Christian is a 84 y.o. female   Dizziness    Dysphagia       Patient with a past medical history of hypertension dyslipidemia hypothyroidism morbid obesity and lumbar radiculopathy was doing well until yesterday at her assisted living facility when she starting having feelings of uneasiness  Patient says leg she had the feeling of like a hot wave running over her body and she became confused  She was playing a tile game with her friends and could not remember how to move the tiles  The symptoms lasted for more than a few minutes and EMS was called  Workup in the emergency room included a CAT scan which was negative for stroke    The patient also reports difficulty swallowing liquids which has been going on for a while but has recently gotten worse  She is also had new onset of shortness of breath on exertion and was going to see her primary care doctor next week for workup    Past Medical History  Past Medical History:   Diagnosis Date    Encounter for general adult medical examination without abnormal findings 09/30/2019    Encounter for preventive health examination    Essential (primary) hypertension 09/28/2018    Benign essential hypertension    Personal history of other diseases of the nervous system and sense organs 07/22/2020    History of trigeminal neuralgia    Personal history of other specified conditions 07/22/2020    History of facial pain    Unspecified cataract     Cataracts, both eyes       Surgical History  Past Surgical History:   Procedure Laterality Date    MR HEAD ANGIO WO IV CONTRAST  11/20/2020    MR HEAD ANGIO WO IV CONTRAST 11/20/2020 [facility] [place] [facility]        Social History  She reports that she has never smoked. She has never used smokeless tobacco. She reports current alcohol use of about 2.0 standard drinks of alcohol per week. She reports that she does not use drugs.    Family History  No family history on file.     Allergies  Amoxicillin    Review of Systems   Neurological:   Positive for dizziness.        Constitutional: not feeling poorly, no fever, no recent weight gain and no recent weight loss.   Eyes: no blurred vision and no diplopia.   ENT: no hearing loss, no tinnitus, no earache, no sore throat, no hoarseness and no swollen glands in the neck.   Cardiovascular: no chest pain, no tightness or heavy pressure, no shortness of breath, no palpitations and no lower extremity edema.   Respiratory: no cough, wheezing or shortness of breath at rest or exertion  Gastrointestinal: no change in bowel habits, no diarrhea, no constipation, no bloody stools, no nausea, no vomiting, no abdominal pain, no signs and symptoms of ulcer disease, no madhavi colored stools and no intolerance to fatty foods.   Genitourinary: no urinary frequency, no dysuria, no hematuria, no burning sensation during urination, urinary stream is not smaller and urinary stream does not start and stop.   Musculoskeletal: no arthralgias, no joint stiffness, no muscle weakness, no back pain and no difficulty walking.   Skin: no rashes, no change in skin color and pigmentation, no skin lesions and no skin lumps.   Neurological: no headaches, no dizziness, no seizures, no tingling, no numbness, no signs and symptoms of stroke and no limb weakness.   Psychiatric: no confusion, no memory lapses or loss, no depression and no sleep disturbances.   Endocrine: no goiter, no thyroid disorder, no diabetes mellitus, no excessive thirst, no dry skin, no cold intolerance, no heat intolerance and no increased urinary frequency.   Hematologic/Lymphatic: is not slow to heal, does not bleed easily, does not bruise easily, no thrombophlebitis, no anemia and no history of blood transfusion.   All other systems have been reviewed and are negative for complaint.     Vitals:    08/14/24 0731   BP: 158/63   Pulse: 54   Resp: 18   Temp: 36.5 °C (97.7 °F)   SpO2: 100%        Scheduled medications  aspirin, 81 mg, oral, Daily  atorvastatin, 80 mg,  oral, Nightly  barium sulfate, , ,   enoxaparin, 40 mg, subcutaneous, q24h  escitalopram, 10 mg, oral, Daily  furosemide, 20 mg, oral, Daily  gabapentin, 100 mg, oral, TID  losartan, 50 mg, oral, Daily  morphine, 4 mg, intravenous, Once  pantoprazole, 40 mg, oral, Daily before breakfast   Or  pantoprazole, 40 mg, intravenous, Daily before breakfast  perflutren lipid microspheres, 0.5-10 mL of dilution, intravenous, Once in imaging  perflutren protein A microsphere, 0.5 mL, intravenous, Once in imaging  psyllium, 1 packet, oral, Daily  sulfur hexafluoride microsphr, 2 mL, intravenous, Once in imaging      Continuous medications     PRN medications  PRN medications: acetaminophen **OR** acetaminophen **OR** acetaminophen, barium sulfate, guaiFENesin, hydrALAZINE **FOLLOWED BY** [START ON 8/15/2024] hydrALAZINE, melatonin, ondansetron **OR** ondansetron, oxygen, polyethylene glycol    Results from last 7 days   Lab Units 08/14/24  0518 08/13/24  2110 08/13/24  1502   WBC AUTO x10*3/uL 6.2 7.6 7.8   HEMOGLOBIN g/dL 11.7* 10.8* 12.3   HEMATOCRIT % 37.7 33.4* 37.3   PLATELETS AUTO x10*3/uL 214 224 248     Results from last 7 days   Lab Units 08/14/24  0518 08/13/24  2110 08/13/24  1642 08/13/24  1502   SODIUM mmol/L 142 140 140 146*   POTASSIUM mmol/L 4.1 4.0 3.5 2.5*   CHLORIDE mmol/L 106 106 107 121*   CO2 mmol/L 27 26 27 18*   BUN mg/dL 19 19 19 13   CREATININE mg/dL 0.94 0.93 0.88 0.53   CALCIUM mg/dL 8.5* 8.2* 8.0* 5.1*   PROTEIN TOTAL g/dL  --   --   --  3.9*   BILIRUBIN TOTAL mg/dL  --   --   --  0.4   ALK PHOS U/L  --   --   --  35   ALT U/L  --   --   --  20   AST U/L  --   --   --  13   GLUCOSE mg/dL 96 83 91 74     Results from last 7 days   Lab Units 08/13/24  1502   TROPHS ng/L 5        CT abdomen pelvis w IV contrast   Final Result   The urinary bladder is not significantly distended but there is a   question of bladder wall thickening and possible pericystic edema    suggesting the possibility of cystitis.    The common bile duct is prominent with a diameter of 1 cm and there is   some minimal central intrahepatic biliary dilatation but this may be   related to long-standing prior cholecystectomy as there are no   obstructing calculi or mass is visible..   Signed by Jame Austin MD      CT brain attack angio head and neck W and WO IV contrast   Final Result   1. Multifocal areas of irregularity and narrowing of the anterior and   posterior circulation without proximal large branch vessel cut off.   MRI with diffusion-weighted imaging would be a much more sensitive   means of assessing for acute ischemic injury.   2. Atherosclerotic changes of the carotid bifurcations and proximal   ICAs with mild narrowing on the right.   3. Additional findings as detailed above.        MACRO:   None        Signed by: Shelia Lamb 8/13/2024 3:50 PM   Dictation workstation:   [id number]      CT brain attack head wo IV contrast   Final Result   1.  No CT evidence for acute intracranial pathology.        2.  Moderate-severe nonspecific white matter hypodensity which can be   seen with chronic small-vessel ischemic disease.        3.  Moderate generalized volume loss.        4. Prominent left paranasal sinus disease as described.                  Findings discussed with Brian Lebron of the emergency room via   telephone at 3:30 p.m. on 08/13/2024        Signed by: Tan Villatoro 8/13/2024 3:36 PM   Dictation workstation:   [id number]      MR brain wo IV contrast    (Results Pending)   MR angio head wo IV contrast    (Results Pending)   MR angio neck wo IV contrast    (Results Pending)   Transthoracic Echo (TTE) Complete    (Results Pending)   FL GI esophagram    (Results Pending)   XR chest 2 views    (Results Pending)       Physical Exam      Constitutional   General appearance: Alert and in no acute distress.   Eyes   Inspection of eyes: Sclera and conjunctiva were normal.    Pupil exam: Pupils were equal in size. Extraocular  movements were intact.   Pulmonary   Respiratory assessment: No respiratory distress, normal respiratory rhythm and effort.    Auscultation of Lungs: Clear bilateral breath sounds.   Cardiovascular   Auscultation of heart: Apical pulse normal, heart rate and rhythm normal, normal S1 and S2, no murmurs and no pericardial rub.    Exam for edema: No peripheral edema.   Abdomen   Abdominal Exam: No bruits, normal bowel sounds, soft, non-tender, no abdominal mass palpated.    Liver and Spleen exam: No hepato-splenomegaly.   Musculoskeletal     Inspection of digits and nails: No clubbing or cyanosis of the fingernails.    Inspection/palpation of joints, bones and muscles: No joint swelling. Normal movement of all extremities.   Skin   Skin inspection: Normal skin color and pigmentation, normal skin turgor and no visible rash.   Neurologic   Cranial nerves: Nerves 2-12 were intact, no focal neuro defects.   Psychiatric   Orientation: Oriented to person, place, and time.    Mood and affect: Normal.      Assessment/Plan      #Transient confusion  Unclear if this is a TIA  And doubt CVA  CAT scan does show atherosclerosis of the carotids  MRI MRA pending  Continue aspirin and statins with target LDL less than 70  Neurology consult pending    #Dyspnea on exertion  New onset  We will await the echocardiogram to get done  Check a chest x-ray    #Dysphagia to liquids  Esophagogram ordered  PPI    #Hypertension  Continue home medications    #Dyslipidemia  Continue statins Target LDL less than 70    #Depression  Continue Celexa

## 2024-08-14 NOTE — PROGRESS NOTES
Medicine PAKAYA follow up note    Subjective:  Pt sitting in chair during visit. Denies pain and SOB currently. Discussed tests that have resulted so far and pending tests - pt to have esophagram today. States dysphagia is most commonly experienced when she drinks cold water    Additional information:      Vitals (Last 24 Hours):  Heart Rate:  [50-64]   Temp:  [36.3 °C (97.3 °F)-37 °C (98.6 °F)]   Resp:  [14-18]   BP: (120-165)/(43-76)   SpO2:  [87 %-100 %]       I have reviewed all imaging reports and labs pertinent to this visit / presenting problem    PHYSICAL EXAM:  Constitutional: NAD, alert and cooperative  Eyes: no icterus  ENMT: mucous membranes moist, no lesions  Head/Neck: supple  Respiratory/Thorax: CTA bilaterally, non-labored breathing, no cough, on RA  Cardiovascular: RRR, no murmurs heard  Gastrointestinal: ND/S/NT  : no Meraz, no SP/flank discomfort  Extremities: mild BLE edema   Neurological: non-focal  Skin: warm and dry  Psych: calm, stable mood     MEDS:  Scheduled meds  aspirin, 81 mg, oral, Daily  atorvastatin, 80 mg, oral, Nightly  barium sulfate, , ,   enoxaparin, 40 mg, subcutaneous, q24h  escitalopram, 10 mg, oral, Daily  furosemide, 20 mg, oral, Daily  gabapentin, 100 mg, oral, TID  losartan, 50 mg, oral, Daily  morphine, 4 mg, intravenous, Once  pantoprazole, 40 mg, oral, Daily before breakfast   Or  pantoprazole, 40 mg, intravenous, Daily before breakfast  perflutren lipid microspheres, 0.5-10 mL of dilution, intravenous, Once in imaging  perflutren protein A microsphere, 0.5 mL, intravenous, Once in imaging  psyllium, 1 packet, oral, Daily  sulfur hexafluoride microsphr, 2 mL, intravenous, Once in imaging        Continuous meds       PRN meds  PRN medications: acetaminophen **OR** acetaminophen **OR** acetaminophen, barium sulfate, guaiFENesin, hydrALAZINE **FOLLOWED BY** [START ON 8/15/2024] hydrALAZINE, melatonin, ondansetron **OR** ondansetron, oxygen, polyethylene  glycol      ASSESSMENT/PLAN:  Pt is an 84 year old female with PMHx of HTN, hypothyroidism, HLD, GERD, lumbar radiculopathy who is admitted for dysphagia, dizziness and weakness. ED course included vitals significant for hypoxia. Labs significant for critically low potassium and calcium. CT head negative for acute pathology. CTA head and neck concerning for irregularity and narrowing of the anterior and posterior circulation, atherosclerotic changes of the carotid bifurcations and proximal ICAs with mild narrowing on the right. CT abd/pel - possible cystitis.     Dysphagia   Weakness  Dizziness   - CT head negative   - CTA head and neck discussed above   - MRA head and neck and MR brain pending  - TSH 6.54, free T4 WNL   - Lipid panel - no dyslipidemia   - Echo ordered   - Seen by neuro - no acute neurological intervention required, symptoms likely related to vasovagal presyncope in the context of difficulty swallowing   - Esophagram - tertiary esophageal contractions, no evidence of strictures or abnormal dilatations, small hiatal hernia   - PPI started   - Seen by SLP - no current intervention needed   - Consider GI consult IP vs OP follow up  - PT/OT recommending low-intensity     SOB on exertion   - Pt was to have PCP appt this week for SOB  - D-dimer 1373, VQ scan ordered - will consider CTA chest if results are indeterminate   - Echo pending   - CXR - no acute pulmonary findings     HTN   - Bps stable   - Continue home losartan     HLD  - Continue atorvastatin     Other comorbidities as above  - Continue medications as ordered and adjust based on clinical course     VTE / GI prophylaxis   - Subcutaneous Lovenox, PPI, bowel regimen in place     Discharge planning  - Return to [place] when med ready     Discussed with Dr. Diop and the interdisciplinary team     Magaly Villar PA-C

## 2024-08-14 NOTE — PROGRESS NOTES
08/14/24 1025   Financial Resource Strain   How hard is it for you to pay for the very basics like food, housing, medical care, and heating? Not hard   Housing Stability   In the last 12 months, was there a time when you were not able to pay the mortgage or rent on time? N   At any time in the past 12 months, were you homeless or living in a shelter (including now)? N   Transportation Needs   In the past 12 months, has lack of transportation kept you from medical appointments or from getting medications? no   In the past 12 months, has lack of transportation kept you from meetings, work, or from getting things needed for daily living? No   Food Insecurity   Within the past 12 months, you worried that your food would run out before you got the money to buy more. Never true   Within the past 12 months, the food you bought just didn't last and you didn't have money to get more. Never true   Stress   Do you feel stress - tense, restless, nervous, or anxious, or unable to sleep at night because your mind is troubled all the time - these days? Not at all   Social Connections   In a typical week, how many times do you talk on the phone with family, friends, or neighbors? More than 3   How often do you get together with friends or relatives? More than 3   Utilities   In the past 12 months has the electric, gas, oil, or water company threatened to shut off services in your home? No   Health Literacy   How often do you need to have someone help you when you read instructions, pamphlets, or other written material from your doctor or pharmacy? Rarely

## 2024-08-15 ENCOUNTER — APPOINTMENT (OUTPATIENT)
Dept: RADIOLOGY | Facility: HOSPITAL | Age: 84
End: 2024-08-15
Payer: COMMERCIAL

## 2024-08-15 DIAGNOSIS — K21.9 GASTROESOPHAGEAL REFLUX DISEASE WITHOUT ESOPHAGITIS: ICD-10-CM

## 2024-08-15 LAB
ANION GAP SERPL CALC-SCNC: 10 MMOL/L (ref 10–20)
BUN SERPL-MCNC: 20 MG/DL (ref 6–23)
CALCIUM SERPL-MCNC: 8.5 MG/DL (ref 8.6–10.3)
CHLORIDE SERPL-SCNC: 104 MMOL/L (ref 98–107)
CO2 SERPL-SCNC: 30 MMOL/L (ref 21–32)
CREAT SERPL-MCNC: 0.95 MG/DL (ref 0.5–1.05)
EGFRCR SERPLBLD CKD-EPI 2021: 59 ML/MIN/1.73M*2
ERYTHROCYTE [DISTWIDTH] IN BLOOD BY AUTOMATED COUNT: 12.3 % (ref 11.5–14.5)
GLUCOSE SERPL-MCNC: 112 MG/DL (ref 74–99)
HCT VFR BLD AUTO: 33 % (ref 36–46)
HGB BLD-MCNC: 10.8 G/DL (ref 12–16)
MCH RBC QN AUTO: 29.2 PG (ref 26–34)
MCHC RBC AUTO-ENTMCNC: 32.7 G/DL (ref 32–36)
MCV RBC AUTO: 89 FL (ref 80–100)
NRBC BLD-RTO: 0 /100 WBCS (ref 0–0)
PLATELET # BLD AUTO: 220 X10*3/UL (ref 150–450)
POTASSIUM SERPL-SCNC: 3.8 MMOL/L (ref 3.5–5.3)
RBC # BLD AUTO: 3.7 X10*6/UL (ref 4–5.2)
SODIUM SERPL-SCNC: 140 MMOL/L (ref 136–145)
WBC # BLD AUTO: 5.8 X10*3/UL (ref 4.4–11.3)

## 2024-08-15 PROCEDURE — 85027 COMPLETE CBC AUTOMATED: CPT | Performed by: INTERNAL MEDICINE

## 2024-08-15 PROCEDURE — A9540 TC99M MAA: HCPCS | Performed by: INTERNAL MEDICINE

## 2024-08-15 PROCEDURE — 97116 GAIT TRAINING THERAPY: CPT | Mod: GP,CQ

## 2024-08-15 PROCEDURE — 1200000002 HC GENERAL ROOM WITH TELEMETRY DAILY

## 2024-08-15 PROCEDURE — 82374 ASSAY BLOOD CARBON DIOXIDE: CPT | Performed by: INTERNAL MEDICINE

## 2024-08-15 PROCEDURE — 2500000001 HC RX 250 WO HCPCS SELF ADMINISTERED DRUGS (ALT 637 FOR MEDICARE OP): Performed by: INTERNAL MEDICINE

## 2024-08-15 PROCEDURE — 99232 SBSQ HOSP IP/OBS MODERATE 35: CPT | Performed by: INTERNAL MEDICINE

## 2024-08-15 PROCEDURE — 3430000001 HC RX 343 DIAGNOSTIC RADIOPHARMACEUTICALS: Performed by: INTERNAL MEDICINE

## 2024-08-15 PROCEDURE — 97530 THERAPEUTIC ACTIVITIES: CPT | Mod: GO,CO

## 2024-08-15 PROCEDURE — 78830 RP LOCLZJ TUM SPECT W/CT 1: CPT | Performed by: RADIOLOGY

## 2024-08-15 PROCEDURE — 2500000004 HC RX 250 GENERAL PHARMACY W/ HCPCS (ALT 636 FOR OP/ED): Performed by: INTERNAL MEDICINE

## 2024-08-15 PROCEDURE — 36415 COLL VENOUS BLD VENIPUNCTURE: CPT | Performed by: INTERNAL MEDICINE

## 2024-08-15 PROCEDURE — 78830 RP LOCLZJ TUM SPECT W/CT 1: CPT

## 2024-08-15 ASSESSMENT — PAIN SCALES - GENERAL
PAINLEVEL_OUTOF10: 0 - NO PAIN

## 2024-08-15 ASSESSMENT — COGNITIVE AND FUNCTIONAL STATUS - GENERAL
MOBILITY SCORE: 17
HELP NEEDED FOR BATHING: A LITTLE
MOVING TO AND FROM BED TO CHAIR: A LITTLE
DAILY ACTIVITIY SCORE: 18
DRESSING REGULAR LOWER BODY CLOTHING: A LOT
DRESSING REGULAR UPPER BODY CLOTHING: A LITTLE
WALKING IN HOSPITAL ROOM: A LITTLE
TURNING FROM BACK TO SIDE WHILE IN FLAT BAD: A LITTLE
STANDING UP FROM CHAIR USING ARMS: A LITTLE
CLIMB 3 TO 5 STEPS WITH RAILING: A LOT
PERSONAL GROOMING: A LITTLE
TOILETING: A LITTLE
MOVING FROM LYING ON BACK TO SITTING ON SIDE OF FLAT BED WITH BEDRAILS: A LITTLE

## 2024-08-15 ASSESSMENT — PAIN - FUNCTIONAL ASSESSMENT
PAIN_FUNCTIONAL_ASSESSMENT: 0-10

## 2024-08-15 NOTE — PROGRESS NOTES
Physical Therapy    Physical Therapy Treatment    Patient Name: Thania Christian  MRN: [id number]  Today's Date: 8/15/2024  Time Calculation  Start Time: 1347  Stop Time: 1405  Time Calculation (min): 18 min    Assessment/Plan   PT Assessment  PT Assessment Results: Decreased strength, Decreased endurance, Impaired balance, Decreased mobility  Rehab Prognosis: Good  Evaluation/Treatment Tolerance: Patient tolerated treatment well  Medical Staff Made Aware: Yes  Strengths: Support of Caregivers, Housing layout, Physical health, Ability to acquire knowledge  Barriers to Participation: Comorbidities  End of Session Communication: Bedside nurse  Assessment Comment: Pt demomstrated progress toward goals this session.  No dizziness reported.  End of Session Patient Position: Up in chair, Alarm on     PT Plan  Treatment/Interventions: Bed mobility, Transfer training, Gait training, Endurance training  PT Plan: Ongoing PT  PT Frequency: 3 times per week  PT Discharge Recommendations: Low intensity level of continued care  Equipment Recommended upon Discharge: Wheeled walker  PT Recommended Transfer Status: Assist x1  PT - OK to Discharge: Yes (per POC)      General Visit Information:   PT  Visit  PT Received On: 08/15/24  Response to Previous Treatment: Patient with no complaints from previous session.  General  Reason for Referral: Pt presenting due to dizziness and difficulty swallowing ~2 days with suspicion of CVA pending MRI. Pt with PMHx of BUE residual weakness after fractures  Referred By: Giovana Diop MD  Prior to Session Communication: Bedside nurse  Patient Position Received: Bed, 3 rail up, Alarm on  Preferred Learning Style: auditory, kinesthetic, verbal    Subjective   Precautions:  Precautions  Medical Precautions: Fall precautions  Vital Signs:  Vital Signs  Heart Rate: 74  Heart Rate Source: Monitor    Objective   Pain:  Pain Assessment  Pain Assessment: 0-10  0-10 (Numeric) Pain Score: 0 - No  pain  Cognition:  Cognition  Overall Cognitive Status: Within Functional Limits  Orientation Level: Oriented X4  Coordination:  Movements are Fluid and Coordinated: Yes  Postural Control:  Static Sitting Balance  Static Sitting-Balance Support: No upper extremity supported, Feet supported  Static Sitting-Level of Assistance: Distant supervision  Dynamic Standing Balance  Dynamic Standing-Balance Support: Bilateral upper extremity supported  Dynamic Standing-Level of Assistance: Close supervision  Dynamic Standing-Balance: Turning (ambulation)  Dynamic Standing-Comments: steady with wk support.    Activity Tolerance:  Activity Tolerance  Endurance: Tolerates 10 - 20 min exercise with multiple rests  Treatments:       Bed Mobility  Bed Mobility: Yes  Bed Mobility 1  Bed Mobility 1: Supine to sitting  Level of Assistance 1: Close supervision  Bed Mobility Comments 1: increased time to complete.    Ambulation/Gait Training  Ambulation/Gait Training Performed: Yes  Ambulation/Gait Training 1  Surface 1: Level tile  Device 1: Rolling walker  Gait Support Devices: Gait belt  Assistance 1: Close supervision  Quality of Gait 1: Decreased step length (decreased luz)  Comments/Distance (ft) 1: 130'   No LOB  Transfers  Transfer: Yes  Transfer 1  Technique 1: Sit to stand, Stand to sit  Transfer Device 1: Walker  Transfer Level of Assistance 1: Close supervision  Trials/Comments 1: hand placement cues needed for safe practice.  Transfers 2  Transfer to 2: Chair with arms  Technique 2: Stand to sit  Transfer Device 2: Walker  Transfer Level of Assistance 2: Close supervision, Minimal verbal cues  Trials/Comments 2: Good alignment with chair.  Cue  for safe wk/body positioning.    Outcome Measures:  [facility] Basic Mobility  Turning from your back to your side while in a flat bed without using bedrails: A little  Moving from lying on your back to sitting on the side of a flat bed without using bedrails: A little  Moving to and  from bed to chair (including a wheelchair): A little  Standing up from a chair using your arms (e.g. wheelchair or bedside chair): A little  To walk in hospital room: A little  Climbing 3-5 steps with railing: A lot  Basic Mobility - Total Score: 17    Education Documentation  Precautions, taught by Karla Harper PTA at 8/15/2024  3:21 PM.  Learner: Patient  Readiness: Eager  Method: Explanation, Demonstration  Response: Verbalizes Understanding, Demonstrated Understanding    Body Mechanics, taught by Karla Harper PTA at 8/15/2024  3:21 PM.  Learner: Patient  Readiness: Eager  Method: Explanation, Demonstration  Response: Verbalizes Understanding, Demonstrated Understanding    Mobility Training, taught by Karla Harper PTA at 8/15/2024  3:21 PM.  Learner: Patient  Readiness: Eager  Method: Explanation, Demonstration  Response: Verbalizes Understanding, Demonstrated Understanding    Education Comments  No comments found.        OP EDUCATION:       Encounter Problems       Encounter Problems (Active)       Balance       LTG - Patient will tolerate standing       Start:  08/14/24    Expected End:  08/28/24       Pt will complete PARKER balance test and score >40/56 demonstrating low risk of falls            Mobility       STG - Patient will ambulate (Progressing)       Start:  08/14/24    Expected End:  08/28/24       >150ft using LRAD (FWW vs rollator)            PT Transfers       STG - Patient will perform bed mobility (Progressing)       Start:  08/14/24    Expected End:  08/28/24       Mod Ind         STG - Patient will transfer sit to and from stand (Progressing)       Start:  08/14/24    Expected End:  08/28/24       Mod Ind            Pain - Adult

## 2024-08-15 NOTE — PROGRESS NOTES
Transitional Care Coordination Progress Note:  Plan per Medical/Surgical team: treatment of difficulty swallowing cold liquids, dizziness, R/O CVA with neuro consult   Status: Inpatient day 2   Payor source: [employer]  Discharge disposition: Home alone in senior apartment- [facility] [place]  Potential Barriers: MRI/MRA, ECHO, VQSCAN, PT/OT evals  ADOD: 8/16/2024   AUTUMN Currie RN, BSN Transitional Care Coordinator ED# [phone]      08/15/24 0857   Discharge Planning   Assistance Needed MRI/MRA, ECHO, VQSCAN, PT/OT evals, neuro following

## 2024-08-15 NOTE — PROGRESS NOTES
Occupational Therapy    Occupational Therapy Treatment    Name: Thania Christian  MRN: [id number]  : 1940  Date: 08/15/24  Time Calculation  Start Time: 1450  Stop Time: 1502  Time Calculation (min): 12 min    Assessment:  Medical Staff Made Aware: Yes  End of Session Communication: Bedside nurse  End of Session Patient Position: Up in chair, Alarm on  Plan:  Treatment Interventions: ADL retraining, Functional transfer training, UE strengthening/ROM, Endurance training, Equipment evaluation/education, Fine motor coordination activities, Compensatory technique education  OT Frequency: 3 times per week  OT Discharge Recommendations: Low intensity level of continued care  Equipment Recommended upon Discharge: Wheeled walker  OT Recommended Transfer Status: Stand by assist  OT - OK to Discharge: Yes (per POC)    Subjective   Previous Visit Info:  OT Last Visit  OT Received On: 08/15/24  General:  General  Reason for Referral: Pt presenting due to dizziness and difficulty swallowing ~2 days with suspicion of CVA pending MRI. Pt with PMHx of BUE residual weakness after fractures  Prior to Session Communication: Bedside nurse  Patient Position Received: Bed, 3 rail up  Preferred Learning Style: auditory, kinesthetic, verbal  General Comment: Pt cooperative and compliant with session.  Precautions:  Medical Precautions: Fall precautions  Vitals:  Vital Signs  Heart Rate: 82  Heart Rate Source: Monitor  Pain Assessment:  Pain Assessment  Pain Assessment: 0-10  0-10 (Numeric) Pain Score: 0 - No pain     Objective   Cognition:  Orientation Level: Oriented X4  Activities of Daily Living: Grooming  Grooming Comments: pt declined    Functional Standing Tolerance:     Bed Mobility/Transfers: Transfers  Transfer: Yes  Transfer 1  Transfer From 1: Stand to  Transfer to 1: Chair with drop arm  Technique 1: Sit to stand  Transfer Device 1: Walker  Transfer Level of Assistance 1: Close supervision      Functional  Mobility:  Functional Mobility  Functional Mobility Performed: Yes  Functional Mobility 1  Surface 1: Level tile  Device 1: Rolling walker  Assistance 1: Close supervision  Comments 1: Pt completed functional mobility with FWW a household distance at [facility].  Standing Balance:  Dynamic Standing Balance  Dynamic Standing-Level of Assistance: Close supervision  Dynamic Standing-Balance: Forward lean, Reaching for objects, Reaching across midline  Dynamic Standing-Comments: Pt completed dynamic standing activity with reaching and crossing midline with reaching from high and low surfaces at [facility]. Pt presents fair/good safety awareness this day.    Outcome Measures:  [facility] Daily Activity  Putting on and taking off regular lower body clothing: A lot  Bathing (including washing, rinsing, drying): A little  Putting on and taking off regular upper body clothing: A little  Toileting, which includes using toilet, bedpan or urinal: A little  Taking care of personal grooming such as brushing teeth: A little  Eating Meals: None  Daily Activity - Total Score: 18      Education Documentation  Body Mechanics, taught by ABRAHAM Morton at 8/15/2024  3:25 PM.  Learner: Patient  Readiness: Acceptance  Method: Explanation, Demonstration  Response: Verbalizes Understanding, Demonstrated Understanding    ADL Training, taught by ABRAHAM Morton at 8/15/2024  3:25 PM.  Learner: Patient  Readiness: Acceptance  Method: Explanation, Demonstration  Response: Verbalizes Understanding, Demonstrated Understanding    Education Comments  No comments found.      Goals:  Encounter Problems       Encounter Problems (Active)       ADLs       Patient will perform UB and LB sponge bathing with stand by assist level of assistance edge of bed. (Not Progressing)       Start:  08/14/24    Expected End:  08/28/24            Patient with complete upper body dressing with stand by assist level of assistance donning and doffing all UE clothes with PRN  adaptive equipment while edge of bed. (Not Progressing)       Start:  08/14/24    Expected End:  08/28/24            Patient with complete lower body dressing with contact guard assist level of assistance donning and doffing all LE clothes  with PRN adaptive equipment while edge of bed. (Not Progressing)       Start:  08/14/24    Expected End:  08/28/24            Patient will complete daily grooming tasks with supervision level of assistance and PRN adaptive equipment while standing. (Not Progressing)       Start:  08/14/24    Expected End:  08/28/24            Patient will complete toileting including hygiene clothing management/hygiene with supervision level of assistance and raised toilet seat and grab bars. (Not Progressing)       Start:  08/14/24    Expected End:  08/28/24               BALANCE       Pt will maintain dynamic standing balance during ADL task with supervision level of assistance in order to demonstrate decreased risk of falling and improved postural control. (Progressing)       Start:  08/14/24    Expected End:  08/28/24               MOBILITY       Patient will perform Functional mobility x Household distances/Community Distances with supervision level of assistance and least restrictive device in order to improve safety and functional mobility. (Progressing)       Start:  08/14/24    Expected End:  08/28/24               TRANSFERS       Patient will complete functional transfers with least restrictive device with modified independent level of assistance. (Progressing)       Start:  08/14/24    Expected End:  08/28/24

## 2024-08-15 NOTE — PROGRESS NOTES
Thania Christian is a 84 y.o. female     Discussed esophagram with the patient  Has increased tertiary contractions    Review of Systems     Constitutional: no fever, no chills, not feeling poorly, not feeling tired   Cardiovascular: no chest pain   Respiratory: no cough, wheezing or shortness of breath a  Gastrointestinal: no abdominal pain, no constipation, no melena, no nausea, no diarrhea, no vomiting and no blood in stools.   Neurological: no headache,   All other systems have been reviewed and are negative for complaint.       Vitals:    08/15/24 1534   BP: 153/63   Pulse:    Resp:    Temp: 36.5 °C (97.7 °F)   SpO2: 95%        Scheduled medications  aspirin, 81 mg, oral, Daily  atorvastatin, 80 mg, oral, Nightly  enoxaparin, 40 mg, subcutaneous, q24h  escitalopram, 10 mg, oral, Daily  furosemide, 20 mg, oral, Daily  gabapentin, 100 mg, oral, TID  losartan, 50 mg, oral, Daily  morphine, 4 mg, intravenous, Once  pantoprazole, 40 mg, oral, Daily before breakfast   Or  pantoprazole, 40 mg, intravenous, Daily before breakfast  perflutren lipid microspheres, 0.5-10 mL of dilution, intravenous, Once in imaging  perflutren protein A microsphere, 0.5 mL, intravenous, Once in imaging  psyllium, 1 packet, oral, Daily  sulfur hexafluoride microsphr, 2 mL, intravenous, Once in imaging      Continuous medications     PRN medications  PRN medications: acetaminophen **OR** acetaminophen **OR** acetaminophen, guaiFENesin, hydrALAZINE **FOLLOWED BY** hydrALAZINE, melatonin, ondansetron **OR** ondansetron, oxygen, polyethylene glycol    Lab Review   Results from last 7 days   Lab Units 08/15/24  0521 08/14/24  0518 08/13/24  2110   WBC AUTO x10*3/uL 5.8 6.2 7.6   HEMOGLOBIN g/dL 10.8* 11.7* 10.8*   HEMATOCRIT % 33.0* 37.7 33.4*   PLATELETS AUTO x10*3/uL 220 214 224     Results from last 7 days   Lab Units 08/15/24  0521 08/14/24  0518 08/13/24  2110 08/13/24  1642 08/13/24  1502   SODIUM mmol/L 140 142 140   < > 146*   POTASSIUM  mmol/L 3.8 4.1 4.0   < > 2.5*   CHLORIDE mmol/L 104 106 106   < > 121*   CO2 mmol/L 30 27 26   < > 18*   BUN mg/dL 20 19 19   < > 13   CREATININE mg/dL 0.95 0.94 0.93   < > 0.53   CALCIUM mg/dL 8.5* 8.5* 8.2*   < > 5.1*   PROTEIN TOTAL g/dL  --   --   --   --  3.9*   BILIRUBIN TOTAL mg/dL  --   --   --   --  0.4   ALK PHOS U/L  --   --   --   --  35   ALT U/L  --   --   --   --  20   AST U/L  --   --   --   --  13   GLUCOSE mg/dL 112* 96 83   < > 74    < > = values in this interval not displayed.     Results from last 7 days   Lab Units 08/13/24  1502   TROPHS ng/L 5        NM Lung perfusion with spect/ct   Final Result   There is nonspecific heterogeneity in perfusion of both lungs   indicating low probability for acute pulmonary embolism.        The interpretation above is based on modified PIOPED II and PISAPED   criteria.        I personally reviewed the images/study and I agree with the findings   as stated by Brody Jimenez MD (resident). This study was   interpreted at [facility],   [place], [place].        MACRO:   None        Signed by: Andre Sheets 8/15/2024 8:54 AM   Dictation workstation:   [id number]      MR brain wo IV contrast   Final Result   No acute intracranial abnormality. Severe parenchymal volume loss   involving the frontal and parietal lobes bilaterally and moderate   microangiopathic disease. While findings may be age related and   associated with vascular disease, an underlying neurodegenerative   process can not be excluded. Correlation with clinical exam   recommended.        Mild atherosclerotic disease of the cervical and intracranial   vasculature as detailed. No large vessel occlusion or aneurysm.        _____________   NASCET criteria for internal carotid artery stenosis:   Mild: 0% to 49%   Moderate: 50% to 69%   Severe: 70% to 99%   Complete Occlusion        Signed by: Tammi Lim 8/15/2024 10:25 AM   Dictation workstation:   [id number]       MR angio head wo IV contrast   Final Result   No acute intracranial abnormality. Severe parenchymal volume loss   involving the frontal and parietal lobes bilaterally and moderate   microangiopathic disease. While findings may be age related and   associated with vascular disease, an underlying neurodegenerative   process can not be excluded. Correlation with clinical exam   recommended.        Mild atherosclerotic disease of the cervical and intracranial   vasculature as detailed. No large vessel occlusion or aneurysm.        _____________   NASCET criteria for internal carotid artery stenosis:   Mild: 0% to 49%   Moderate: 50% to 69%   Severe: 70% to 99%   Complete Occlusion        Signed by: Tammi Lim 8/15/2024 10:25 AM   Dictation workstation:   [id number]      MR angio neck wo IV contrast   Final Result   No acute intracranial abnormality. Severe parenchymal volume loss   involving the frontal and parietal lobes bilaterally and moderate   microangiopathic disease. While findings may be age related and   associated with vascular disease, an underlying neurodegenerative   process can not be excluded. Correlation with clinical exam   recommended.        Mild atherosclerotic disease of the cervical and intracranial   vasculature as detailed. No large vessel occlusion or aneurysm.        _____________   NASCET criteria for internal carotid artery stenosis:   Mild: 0% to 49%   Moderate: 50% to 69%   Severe: 70% to 99%   Complete Occlusion        Signed by: Tammi Lim 8/15/2024 10:25 AM   Dictation workstation:   [id number]      FL GI esophagram   Final Result   1. Slightly limited exam due to patient's poor mobility demonstrating   no evidence of strictures. No abnormal dilatations.   2. Multiple tertiary esophageal contractions with slight delay in   esophageal clearing especially when lying flat.   3. Small hiatal hernia.             MACRO:   None        Signed by: Stevenson Louise 8/14/2024 3:54 PM    Dictation workstation:   [id number]      XR chest 2 views   Final Result   No definite acute cardiopulmonary process radiographically.        MACRO:   None.        Signed by: Milena Peace 8/14/2024 12:04 PM   Dictation workstation:   [id number]      CT abdomen pelvis w IV contrast   Final Result   The urinary bladder is not significantly distended but there is a   question of bladder wall thickening and possible pericystic edema    suggesting the possibility of cystitis.   The common bile duct is prominent with a diameter of 1 cm and there is   some minimal central intrahepatic biliary dilatation but this may be   related to long-standing prior cholecystectomy as there are no   obstructing calculi or mass is visible..   Signed by Jame Austin MD      CT brain attack angio head and neck W and WO IV contrast   Final Result   1. Multifocal areas of irregularity and narrowing of the anterior and   posterior circulation without proximal large branch vessel cut off.   MRI with diffusion-weighted imaging would be a much more sensitive   means of assessing for acute ischemic injury.   2. Atherosclerotic changes of the carotid bifurcations and proximal   ICAs with mild narrowing on the right.   3. Additional findings as detailed above.        MACRO:   None        Signed by: Shelia Lamb 8/13/2024 3:50 PM   Dictation workstation:   [id number]      CT brain attack head wo IV contrast   Final Result   1.  No CT evidence for acute intracranial pathology.        2.  Moderate-severe nonspecific white matter hypodensity which can be   seen with chronic small-vessel ischemic disease.        3.  Moderate generalized volume loss.        4. Prominent left paranasal sinus disease as described.                  Findings discussed with Brian Lebron of the emergency room via   telephone at 3:30 p.m. on 08/13/2024        Signed by: Tan Villatoro 8/13/2024 3:36 PM   Dictation workstation:   [id number]      Transthoracic Echo  (TTE) Complete    (Results Pending)         Physical Exam    Constitutional   General appearance: Alert and in no acute distress.   Pulmonary   Respiratory assessment: No respiratory distress, normal respiratory rhythm and effort.    Auscultation of Lungs: Clear bilateral breath sounds.   Cardiovascular   Auscultation of heart: Apical pulse normal, heart rate and rhythm normal, normal S1 and S2, no murmurs and no pericardial rub.    Exam for edema: No peripheral edema.   Abdomen   Abdominal Exam: No bruits, normal bowel sounds, soft, non-tender, no abdominal mass palpated.    Liver and Spleen exam: No hepato-splenomegaly.   Musculoskeletal   Examination of gait: Normal.    Inspection of digits and nails: No clubbing or cyanosis of the fingernails.    Inspection/palpation of joints, bones and muscles: No joint swelling. Normal movement of all extremities.   Neurologic   Cranial nerves: Nerves 2-12 were intact, no focal neuro defects.         Assessment/Plan      #Transient confusion  Stroke ruled out  MRI does show severe frontal and parietal volume loss on the brain  Would continue aspirin and statins     #Dyspnea on exertion  Echocardiogram pending     #Dysphagia to liquids  Esophagogram shows tertiary contractions  Recommend outpatient manometry with GI  Continue PPI    #Hypertension  Continue home medications     #Dyslipidemia  Continue statins Target LDL less than 70     #Depression  Continue Celexa

## 2024-08-16 VITALS
DIASTOLIC BLOOD PRESSURE: 66 MMHG | OXYGEN SATURATION: 95 % | HEIGHT: 58 IN | WEIGHT: 185 LBS | HEART RATE: 68 BPM | RESPIRATION RATE: 18 BRPM | TEMPERATURE: 98.3 F | SYSTOLIC BLOOD PRESSURE: 147 MMHG | BODY MASS INDEX: 38.83 KG/M2

## 2024-08-16 LAB
ANION GAP SERPL CALC-SCNC: 13 MMOL/L (ref 10–20)
BUN SERPL-MCNC: 16 MG/DL (ref 6–23)
CALCIUM SERPL-MCNC: 8.7 MG/DL (ref 8.6–10.3)
CHLORIDE SERPL-SCNC: 104 MMOL/L (ref 98–107)
CO2 SERPL-SCNC: 25 MMOL/L (ref 21–32)
CREAT SERPL-MCNC: 0.83 MG/DL (ref 0.5–1.05)
EGFRCR SERPLBLD CKD-EPI 2021: 69 ML/MIN/1.73M*2
ERYTHROCYTE [DISTWIDTH] IN BLOOD BY AUTOMATED COUNT: 12.1 % (ref 11.5–14.5)
GLUCOSE BLD MANUAL STRIP-MCNC: 154 MG/DL (ref 74–99)
GLUCOSE SERPL-MCNC: 97 MG/DL (ref 74–99)
HCT VFR BLD AUTO: 38.5 % (ref 36–46)
HGB BLD-MCNC: 12.1 G/DL (ref 12–16)
MCH RBC QN AUTO: 28.9 PG (ref 26–34)
MCHC RBC AUTO-ENTMCNC: 31.4 G/DL (ref 32–36)
MCV RBC AUTO: 92 FL (ref 80–100)
NRBC BLD-RTO: 0 /100 WBCS (ref 0–0)
PLATELET # BLD AUTO: 247 X10*3/UL (ref 150–450)
POTASSIUM SERPL-SCNC: 4.3 MMOL/L (ref 3.5–5.3)
RBC # BLD AUTO: 4.19 X10*6/UL (ref 4–5.2)
SODIUM SERPL-SCNC: 138 MMOL/L (ref 136–145)
WBC # BLD AUTO: 6.9 X10*3/UL (ref 4.4–11.3)

## 2024-08-16 PROCEDURE — 2500000004 HC RX 250 GENERAL PHARMACY W/ HCPCS (ALT 636 FOR OP/ED): Performed by: INTERNAL MEDICINE

## 2024-08-16 PROCEDURE — 97530 THERAPEUTIC ACTIVITIES: CPT | Mod: GO,CO

## 2024-08-16 PROCEDURE — 2500000001 HC RX 250 WO HCPCS SELF ADMINISTERED DRUGS (ALT 637 FOR MEDICARE OP): Performed by: INTERNAL MEDICINE

## 2024-08-16 PROCEDURE — 85027 COMPLETE CBC AUTOMATED: CPT | Performed by: INTERNAL MEDICINE

## 2024-08-16 PROCEDURE — 36415 COLL VENOUS BLD VENIPUNCTURE: CPT | Performed by: INTERNAL MEDICINE

## 2024-08-16 PROCEDURE — 82947 ASSAY GLUCOSE BLOOD QUANT: CPT

## 2024-08-16 PROCEDURE — 80048 BASIC METABOLIC PNL TOTAL CA: CPT | Performed by: INTERNAL MEDICINE

## 2024-08-16 PROCEDURE — 97535 SELF CARE MNGMENT TRAINING: CPT | Mod: GO,CO

## 2024-08-16 ASSESSMENT — COGNITIVE AND FUNCTIONAL STATUS - GENERAL
DRESSING REGULAR LOWER BODY CLOTHING: A LITTLE
DAILY ACTIVITIY SCORE: 19
STANDING UP FROM CHAIR USING ARMS: A LITTLE
MOBILITY SCORE: 19
HELP NEEDED FOR BATHING: A LITTLE
DAILY ACTIVITIY SCORE: 19
TOILETING: A LITTLE
PERSONAL GROOMING: A LITTLE
TOILETING: A LITTLE
WALKING IN HOSPITAL ROOM: A LITTLE
DRESSING REGULAR UPPER BODY CLOTHING: A LITTLE
DRESSING REGULAR LOWER BODY CLOTHING: A LITTLE
HELP NEEDED FOR BATHING: A LITTLE
DRESSING REGULAR UPPER BODY CLOTHING: A LITTLE
CLIMB 3 TO 5 STEPS WITH RAILING: A LOT
PERSONAL GROOMING: A LITTLE
MOVING TO AND FROM BED TO CHAIR: A LITTLE

## 2024-08-16 ASSESSMENT — PAIN - FUNCTIONAL ASSESSMENT
PAIN_FUNCTIONAL_ASSESSMENT: 0-10

## 2024-08-16 ASSESSMENT — PAIN SCALES - GENERAL
PAINLEVEL_OUTOF10: 0 - NO PAIN

## 2024-08-16 ASSESSMENT — ACTIVITIES OF DAILY LIVING (ADL): HOME_MANAGEMENT_TIME_ENTRY: 14

## 2024-08-16 NOTE — DISCHARGE SUMMARY
Discharge Diagnosis  Ac isch multi [facility] stroke (Multi)    Issues Requiring Follow-Up      Test Results Pending At Discharge  Pending Labs       No current pending labs.            Hospital Course  Patient with a past medical history of hypertension dyslipidemia hypothyroidism morbid obesity and lumbar radiculopathy was doing well until yesterday at her assisted living facility when she starting having feelings of uneasiness  Patient says leg she had the feeling of like a hot wave running over her body and she became confused  She was playing a tile game with her friends and could not remember how to move the tiles  The symptoms lasted for more than a few minutes and EMS was called  Workup in the emergency room included a CAT scan which was negative for stroke     The patient also reports difficulty swallowing liquids which has been going on for a while but has recently gotten worse    #Transient confusion  Stroke ruled out  MRI does show severe frontal and parietal volume loss on the brain  Would continue aspirin and statins          #Dysphagia to liquids  Esophagogram shows tertiary contractions  Recommend outpatient manometry with GI  Continue PPI     #Hypertension  Continue home medications     #Dyslipidemia  Continue statins Target LDL less than 70     #Depression  Continue Celexa       Pertinent Physical Exam At Time of Discharge  Physical Exam    Home Medications     Medication List      CONTINUE taking these medications     atenolol 50 mg tablet; Commonly known as: Tenormin; Take 1 tablet (50   mg) by mouth 2 times a day.   biotin 10 mg tablet   escitalopram 10 mg tablet; Commonly known as: Lexapro; Take 1 tablet (10   mg) by mouth once daily.   furosemide 20 mg tablet; Commonly known as: Lasix; Take 1 tablet (20 mg)   by mouth once daily.   gabapentin 100 mg capsule; Commonly known as: Neurontin; Take 1 capsule   (100 mg) by mouth 3 times a day.   losartan 50 mg tablet; Commonly known as: Cozaar;  Take 1 tablet (50 mg)   by mouth once daily.   pantoprazole 40 mg EC tablet; Commonly known as: ProtoNix; Take 1 tablet   (40 mg) by mouth once daily. Do not crush, chew, or split.   rosuvastatin 10 mg tablet; Commonly known as: Crestor; Take 1 tablet (10   mg) by mouth once daily.   Tums 200 mg calcium chewable tablet; Generic drug: calcium carbonate     STOP taking these medications     naproxen 500 mg tablet; Commonly known as: Naprosyn     ASK your doctor about these medications     Eucrisa 2 % ointment; Generic drug: crisaborole; Apply to affected area   twice daily       Outpatient Follow-Up  Future Appointments   Date Time Provider Department Center   8/19/2024  9:45 AM Miladys Mcleod DO [id number] [facility]   10/25/2024 11:30 AM Miladys Mcleod DO [id number] [facility]   5/7/2025  9:00 AM Dalila Riley MD [id number] [facility]       Giovana Diop MD

## 2024-08-16 NOTE — PROGRESS NOTES
Occupational Therapy    Occupational Therapy Treatment    Name: Thania Christian  MRN: [id number]  : 1940  Date: 24  Time Calculation  Start Time: 1125  Stop Time: 1149  Time Calculation (min): 24 min    Assessment:  Medical Staff Made Aware: Yes  End of Session Communication: Bedside nurse, PCT/NA/CTA  End of Session Patient Position: Up in chair, Alarm on  Plan:  Treatment Interventions: ADL retraining, Functional transfer training, UE strengthening/ROM, Endurance training, Equipment evaluation/education, Fine motor coordination activities, Compensatory technique education  OT Frequency: 3 times per week  OT Discharge Recommendations: Low intensity level of continued care  Equipment Recommended upon Discharge: Wheeled walker  OT Recommended Transfer Status: Stand by assist  OT - OK to Discharge: Yes (per POC)    Subjective   Previous Visit Info:  OT Last Visit  OT Received On: 24  General:  General  Reason for Referral: Pt presenting due to dizziness and difficulty swallowing ~2 days with suspicion of CVA pending MRI. Pt with PMHx of BUE residual weakness after fractures  Prior to Session Communication: Bedside nurse  Patient Position Received: Bed, 3 rail up, Alarm on  Preferred Learning Style: auditory, kinesthetic, verbal  General Comment: pt cooperative and compliant with session  Precautions:  Medical Precautions: Fall precautions  Vitals:  Vital Signs  Heart Rate: 75  Heart Rate Source: Monitor  Pain Assessment:  Pain Assessment  Pain Assessment: 0-10  0-10 (Numeric) Pain Score: 0 - No pain     Objective   Cognition:  Orientation Level: Oriented X4  Activities of Daily Living: Grooming  Grooming Level of Assistance: Close supervision, Setup  Grooming Where Assessed: Standing sinkside  Grooming Comments: pt completed face washing, tooth brushingm hair brushing and donned deodorant (VC provided for task initiation)    UE Bathing  UE Bathing Level of Assistance: Close supervision, Setup  UE  Bathing Where Assessed: Standing sinkside  UE Bathing Comments: pt required VC for task initiation    UE Dressing  UE Dressing Level of Assistance: Minimum assistance, Minimal verbal cues  UE Dressing Where Assessed: Other (Comment) (standing sinkside)  UE Dressing Comments: pt required VC for participation    LE Dressing  LE Dressing: Yes  Adult Briefs Level of Assistance: Minimum assistance, Minimal verbal cues  LE Dressing Where Assessed: Toilet  LE Dressing Comments: assist to don/doff feet    Toileting  Toileting Level of Assistance: Close supervision, Setup  Where Assessed: Toilet    Functional Standing Tolerance:     Bed Mobility/Transfers: Bed Mobility  Bed Mobility: Yes  Bed Mobility 1  Bed Mobility 1: Supine to sitting  Level of Assistance 1: Contact guard  Bed Mobility Comments 1: assist for trunk control with increased time to complete    Transfers  Transfer: Yes  Transfer 1  Transfer From 1: Bed to  Transfer to 1: Toilet  Technique 1: Sit to stand, Stand to sit  Transfer Device 1: Walker  Transfer Level of Assistance 1: Close supervision  Trials/Comments 1: pt utilized grab bars for UE support  Transfers 2  Transfer From 2: Toilet to  Transfer to 2: Chair with drop arm  Technique 2: Sit to stand, Stand to sit  Transfer Device 2: Walker  Transfer Level of Assistance 2: Close supervision  Trials/Comments 2: VC provided hand placement    Functional Mobility:  Functional Mobility  Functional Mobility Performed: Yes  Functional Mobility 1  Surface 1: Level tile  Device 1: Rolling walker  Assistance 1: Close supervision  Comments 1: Pt completed functional mobility with FWW a short household distance at A.  Standing Balance:  Dynamic Standing Balance  Dynamic Standing-Level of Assistance: Close supervision  Dynamic Standing-Balance: Reaching for objects, Reaching across midline, Forward lean  Dynamic Standing-Comments: pt tolerated standing at sink with FWW ~13 min at SBA to complete self care  tasks.    Outcome Measures:  [facility] Daily Activity  Putting on and taking off regular lower body clothing: A little  Bathing (including washing, rinsing, drying): A little  Putting on and taking off regular upper body clothing: A little  Toileting, which includes using toilet, bedpan or urinal: A little  Taking care of personal grooming such as brushing teeth: A little  Eating Meals: None  Daily Activity - Total Score: 19    Education Documentation  Body Mechanics, taught by ABRAHAM Morton at 8/16/2024  1:20 PM.  Learner: Patient  Readiness: Acceptance  Method: Demonstration, Explanation  Response: Verbalizes Understanding, Demonstrated Understanding    ADL Training, taught by ABRAHAM Morton at 8/16/2024  1:20 PM.  Learner: Patient  Readiness: Acceptance  Method: Demonstration, Explanation  Response: Verbalizes Understanding, Demonstrated Understanding    Body Mechanics, taught by ABRAHAM Morton at 8/15/2024  3:25 PM.  Learner: Patient  Readiness: Acceptance  Method: Explanation, Demonstration  Response: Verbalizes Understanding, Demonstrated Understanding    ADL Training, taught by ABRAHAM Morton at 8/15/2024  3:25 PM.  Learner: Patient  Readiness: Acceptance  Method: Explanation, Demonstration  Response: Verbalizes Understanding, Demonstrated Understanding    Education Comments  No comments found.      Goals:  Encounter Problems       Encounter Problems (Active)       ADLs       Patient will perform UB and LB sponge bathing with stand by assist level of assistance edge of bed. (Progressing)       Start:  08/14/24    Expected End:  08/28/24            Patient with complete upper body dressing with stand by assist level of assistance donning and doffing all UE clothes with PRN adaptive equipment while edge of bed. (Progressing)       Start:  08/14/24    Expected End:  08/28/24            Patient with complete lower body dressing with contact guard assist level of assistance donning and  doffing all LE clothes  with PRN adaptive equipment while edge of bed. (Progressing)       Start:  08/14/24    Expected End:  08/28/24            Patient will complete daily grooming tasks with supervision level of assistance and PRN adaptive equipment while standing. (Progressing)       Start:  08/14/24    Expected End:  08/28/24            Patient will complete toileting including hygiene clothing management/hygiene with supervision level of assistance and raised toilet seat and grab bars. (Progressing)       Start:  08/14/24    Expected End:  08/28/24               BALANCE       Pt will maintain dynamic standing balance during ADL task with supervision level of assistance in order to demonstrate decreased risk of falling and improved postural control. (Progressing)       Start:  08/14/24    Expected End:  08/28/24               MOBILITY       Patient will perform Functional mobility x Household distances/Community Distances with supervision level of assistance and least restrictive device in order to improve safety and functional mobility. (Progressing)       Start:  08/14/24    Expected End:  08/28/24               TRANSFERS       Patient will complete functional transfers with least restrictive device with modified independent level of assistance. (Progressing)       Start:  08/14/24    Expected End:  08/28/24

## 2024-08-16 NOTE — PROGRESS NOTES
08/16/24 0945   Current Planned Discharge Disposition   Current Planned Discharge Disposition Home H     Per notes patient lives home alone, plan on discharge would be home with [facility], patient would not qualify for SNF as PT/OT eval recommending Low, pending echo and possible Esophagram, will continue to monitor for discharge planning.

## 2024-08-19 ENCOUNTER — DOCUMENTATION (OUTPATIENT)
Dept: PRIMARY CARE | Facility: CLINIC | Age: 84
End: 2024-08-19

## 2024-08-19 ENCOUNTER — APPOINTMENT (OUTPATIENT)
Dept: PRIMARY CARE | Facility: CLINIC | Age: 84
End: 2024-08-19
Payer: COMMERCIAL

## 2024-08-19 VITALS
DIASTOLIC BLOOD PRESSURE: 82 MMHG | HEIGHT: 58 IN | TEMPERATURE: 97.8 F | OXYGEN SATURATION: 98 % | BODY MASS INDEX: 38.62 KG/M2 | HEART RATE: 69 BPM | RESPIRATION RATE: 18 BRPM | WEIGHT: 184 LBS | SYSTOLIC BLOOD PRESSURE: 128 MMHG

## 2024-08-19 DIAGNOSIS — R73.9 HYPERGLYCEMIA: ICD-10-CM

## 2024-08-19 DIAGNOSIS — F32.A MILD DEPRESSION: ICD-10-CM

## 2024-08-19 DIAGNOSIS — M54.16 LUMBAR RADICULOPATHY: ICD-10-CM

## 2024-08-19 DIAGNOSIS — E78.5 HYPERLIPIDEMIA, UNSPECIFIED HYPERLIPIDEMIA TYPE: ICD-10-CM

## 2024-08-19 DIAGNOSIS — E87.6 HYPOKALEMIA: ICD-10-CM

## 2024-08-19 DIAGNOSIS — D64.9 ANEMIA, UNSPECIFIED TYPE: ICD-10-CM

## 2024-08-19 DIAGNOSIS — N18.31 CHRONIC KIDNEY DISEASE, STAGE 3A (MULTI): ICD-10-CM

## 2024-08-19 DIAGNOSIS — E03.9 ACQUIRED HYPOTHYROIDISM: ICD-10-CM

## 2024-08-19 DIAGNOSIS — E03.9 HYPOTHYROIDISM, UNSPECIFIED TYPE: ICD-10-CM

## 2024-08-19 DIAGNOSIS — R06.09 DYSPNEA ON EXERTION: ICD-10-CM

## 2024-08-19 DIAGNOSIS — I10 ESSENTIAL HYPERTENSION: Primary | ICD-10-CM

## 2024-08-19 DIAGNOSIS — K52.9 CHRONIC DIARRHEA: ICD-10-CM

## 2024-08-19 PROCEDURE — 3079F DIAST BP 80-89 MM HG: CPT | Performed by: FAMILY MEDICINE

## 2024-08-19 PROCEDURE — 3074F SYST BP LT 130 MM HG: CPT | Performed by: FAMILY MEDICINE

## 2024-08-19 PROCEDURE — 1111F DSCHRG MED/CURRENT MED MERGE: CPT | Performed by: FAMILY MEDICINE

## 2024-08-19 PROCEDURE — 1125F AMNT PAIN NOTED PAIN PRSNT: CPT | Performed by: FAMILY MEDICINE

## 2024-08-19 PROCEDURE — 1036F TOBACCO NON-USER: CPT | Performed by: FAMILY MEDICINE

## 2024-08-19 PROCEDURE — 1159F MED LIST DOCD IN RCRD: CPT | Performed by: FAMILY MEDICINE

## 2024-08-19 PROCEDURE — 1123F ACP DISCUSS/DSCN MKR DOCD: CPT | Performed by: FAMILY MEDICINE

## 2024-08-19 PROCEDURE — 99496 TRANSJ CARE MGMT HIGH F2F 7D: CPT | Performed by: FAMILY MEDICINE

## 2024-08-19 PROCEDURE — 1157F ADVNC CARE PLAN IN RCRD: CPT | Performed by: FAMILY MEDICINE

## 2024-08-19 RX ORDER — POTASSIUM CHLORIDE 750 MG/1
10 TABLET, FILM COATED, EXTENDED RELEASE ORAL DAILY
Qty: 30 TABLET | Refills: 2 | Status: SHIPPED | OUTPATIENT
Start: 2024-08-19

## 2024-08-19 RX ORDER — ROSUVASTATIN CALCIUM 10 MG/1
10 TABLET, COATED ORAL DAILY
Qty: 90 TABLET | Refills: 1 | Status: SHIPPED | OUTPATIENT
Start: 2024-08-19

## 2024-08-19 ASSESSMENT — PAIN SCALES - GENERAL: PAINLEVEL: 2

## 2024-08-19 NOTE — PROGRESS NOTES
Discharge facility: [facility]  Discharge diagnosis: Ac isch multi vasc territories stroke   Admission date: 8/14/24  Discharge date: 8/16/24    PCP Appointment Date: 8/19/24  Specialist Appointment Date: 5/7/24 \[facility]\""  Hospital Encounter and Summary: Linked    See Discharge assessment below for further details    This patient has a follow up scheduled with PCP within 2 business days of DC on  8/19/24.   This visit qualifies for TCM billing.    Moderately complex & follow-up within 14 days- bill [id number] for hospital follow up.   Highly complex and follow-up visit within 7 days-can bill [id number] for hospital follow up    *virtual follow up needs modifier added (95 or GT)   *AWV AND TCM CAN BE BILLED TOGETHER WITH 25 MODIFIER

## 2024-08-19 NOTE — PROGRESS NOTES
Subjective   Patient ID: Thania Christian is a 84 y.o. female who presents for 4 wk f/up.    HPI   The patient presents to the clinic for a 4-week follow-up and hospital follow-up. She has past medical history of hypertension, hyperlipidemia, hypothyroidism, depression, ankle edema, lumbar radiculopathy, COPD, atopic dermatitis, and JENNA.    The patient's daughter accompanied her to the clinic today.    The patient recalls that she visited the ER on 08/13/2024 with concerns of dizziness, dysphagia, and reduced sensation in her left arm/leg. She recalls that she felt dysfunctional (difficulty computing thoughts) for the past ~2-3 days prior to this hospital encounter. She recalls that felt confusion, brain fog, and cold flashes prior to presenting to the ER. At the ER, due to her initial symptoms and examination, there was concerns for CVA (stroke). Consequently, stroke alert was activated for the patient. Her initial CT brain/CT angiography were mostly normal. However, imaging did show moderate-severe nonspecific white matter hypodensity (which can be seen with chronic small-vessel ischemic disease). Imaging also showed prominent left paranasal sinus disease. She also had some nausea and mild abdominal tenderness upon presenting to the ER. Consequently, a CMP, lipase, and CT abdoemn/pelvis were conducted on the patient. All of these tests were fairly normal, but CT abdomen/pelvis showed possible cystitis. Had urine and was ok. Her initial labs also did show hypokalemia. The patient was also noted to be suffering from BLUE (dyspnea on exertion)which has been chronic off and on, thus a chest x-ray was conducted on the patient. Her chest x-ray was unremarkable. She also had a lung perfusion and EKG, which were fairly normal. For evaluation of dysphagia, an esophagram was ordered for the patient. Her esophagram was mostly normal. She was instructed to continue on PPI medication (pantoprazole 40 mg daily). She was discharged  "from the hospital in stable condition on 08/16/2024.  She was advised to discontinue naproxen medication upon discharge. Now, the patient states that she has not experienced another episode of these symptoms since she was discharged from the ER.    Tsh was high in hospital, will recheck    The patient notes that she suffers from diarrhea symptoms frequently (diarrhea symptoms last for ~2-3 days every few weeks). She thinks that these symptoms could have been the cause of her hypokalemia at the ER. She takes an imodium to control her diarrhea symptoms when she leaves her home. Otherwise, she attempts to stay mindful of her diet to control diarrhea symptoms (avoids coffee/milk/etc and sticks with meat/vegetables).    She has not had any reoccurrence of s/s that caused her to get hospitalized and no cause was found on testing, ruled out stroke, question if s/s due to TIA or cardiac arrhythmia/low K    She continues on rosuvastatin 10 mg daily for treatment of hyperlipidemia. She is tolerating this dose of rosuvastatin medication well. She denies any side-effects to her rosuvastatin medication.    Review of Systems    Objective   /82   Pulse 69   Temp 36.6 °C (97.8 °F)   Resp 18   Ht 1.473 m (4' 10\")   Wt 83.5 kg (184 lb)   SpO2 98%   BMI 38.46 kg/m²     Physical Exam  Constitutional:       Appearance: Normal appearance.   Neck:      Vascular: No carotid bruit.   Cardiovascular:      Rate and Rhythm: Normal rate and regular rhythm.      Pulses: Normal pulses.      Heart sounds: Normal heart sounds.   Pulmonary:      Effort: Pulmonary effort is normal.      Breath sounds: Normal breath sounds.   Musculoskeletal:      Cervical back: Normal range of motion.      Right lower leg: No edema.      Left lower leg: No edema.   Lymphadenopathy:      Cervical: No cervical adenopathy.   Skin:     General: Skin is warm and dry.   Neurological:      Mental Status: She is alert and oriented to person, place, and time. "   Psychiatric:         Mood and Affect: Mood normal.         Behavior: Behavior normal.         Thought Content: Thought content normal.         Judgment: Judgment normal.         Assessment/Plan   Problem List Items Addressed This Visit             ICD-10-CM    Acquired hypothyroidism E03.9    Chronic diarrhea K52.9    Chronic kidney disease, stage 3a (Multi) N18.31    Lumbar radiculopathy M54.16    Mild depression F32.A    Hyperglycemia R73.9     Other Visit Diagnoses         Codes    Essential hypertension    -  Primary I10    Relevant Orders    CBC and Auto Differential    Hyperlipidemia, unspecified hyperlipidemia type     E78.5    Relevant Medications    rosuvastatin (Crestor) 10 mg tablet    Other Relevant Orders    Comprehensive Metabolic Panel    Lipid Panel    Hypothyroidism, unspecified type     E03.9    Relevant Orders    TSH with reflex to Free T4 if abnormal    Anemia, unspecified type     D64.9    Relevant Orders    Iron level    Ferritin    Dyspnea on exertion     R06.09    Relevant Orders    Referral to Cardiology    Hypokalemia     E87.6    Relevant Medications    potassium chloride CR (Klor-Con) 10 mEq ER tablet               In regards to her recent ER visit, the patient's lab results and imaging results were comprehensively reviewed. Repeat/additional labs (CBC, CMP, ferritin, iron level, lipid panel, TSH) were ordered for the patient. She intends to complete her labs soon. The clinic will contact the patient upon receiving her lab results. For now, she was instructed to continue monitoring symptoms for improvement/exacerbation.    In regards to concerns with dyspnea on exertion and SOB, the patient received a referral to a cardiologist for further evaluation. She was instructed to continue monitoring symptoms for improvement/exacerbation.  She states was to have TTE while in hospital, discharge summary not available yet and will discuss w cardiology if needed    In regards to concerns with  hypokalemia, the patient received a prescription for potassium chloride CR 10 mEq. She was instructed to take 1 tablet (10 mEq) of potassium chloride CR by mouth once daily. She will be re-evaluated based on prospective CMP results. She was instructed to continue monitoring symptoms for improvement/exacerbation.  Question if in the past worsened diarrhea, pt will try and notice if happens more often.  Discussed possible GI consult for intermittent diarrhea, she will watch what she is eating in dining room at her [place] and older community to see if can see pattern first    In regards to her hyperlipidemia, the patient received a refill for her rosuvastatin 10 mg medication. She was instructed to continue taking rosuvastatin medication daily. She will be re-evaluated based on prospective lipid panel results.    She will follow-up in ~6 weeks, unless otherwise needed.    Scribe Attestation  By signing my name below, IAlisson Scribe   attest that this documentation has been prepared under the direction and in the presence of Miladys Mcleod DO.

## 2024-08-21 DIAGNOSIS — M54.16 LUMBAR RADICULOPATHY: Primary | ICD-10-CM

## 2024-08-21 DIAGNOSIS — R53.1 WEAKNESS GENERALIZED: ICD-10-CM

## 2024-08-21 DIAGNOSIS — R26.89 BALANCE PROBLEMS: ICD-10-CM

## 2024-08-21 DIAGNOSIS — K21.9 GASTROESOPHAGEAL REFLUX DISEASE WITHOUT ESOPHAGITIS: ICD-10-CM

## 2024-08-21 RX ORDER — PANTOPRAZOLE SODIUM 40 MG/1
40 TABLET, DELAYED RELEASE ORAL DAILY
Qty: 90 TABLET | Refills: 1 | Status: SHIPPED | OUTPATIENT
Start: 2024-08-21 | End: 2024-08-21 | Stop reason: SDUPTHER

## 2024-08-21 RX ORDER — PANTOPRAZOLE SODIUM 40 MG/1
40 TABLET, DELAYED RELEASE ORAL DAILY
Qty: 90 TABLET | Refills: 1 | Status: SHIPPED | OUTPATIENT
Start: 2024-08-21

## 2024-08-22 ENCOUNTER — LAB (OUTPATIENT)
Dept: LAB | Facility: LAB | Age: 84
End: 2024-08-22
Payer: COMMERCIAL

## 2024-08-22 DIAGNOSIS — D64.9 ANEMIA, UNSPECIFIED TYPE: ICD-10-CM

## 2024-08-22 DIAGNOSIS — I10 ESSENTIAL HYPERTENSION: ICD-10-CM

## 2024-08-22 DIAGNOSIS — E03.9 HYPOTHYROIDISM, UNSPECIFIED TYPE: ICD-10-CM

## 2024-08-22 DIAGNOSIS — E78.5 HYPERLIPIDEMIA, UNSPECIFIED HYPERLIPIDEMIA TYPE: ICD-10-CM

## 2024-08-22 LAB
ALBUMIN SERPL BCP-MCNC: 3.8 G/DL (ref 3.4–5)
ALP SERPL-CCNC: 57 U/L (ref 33–136)
ALT SERPL W P-5'-P-CCNC: 16 U/L (ref 7–45)
ANION GAP SERPL CALC-SCNC: 15 MMOL/L (ref 10–20)
AST SERPL W P-5'-P-CCNC: 16 U/L (ref 9–39)
BASOPHILS # BLD AUTO: 0.06 X10*3/UL (ref 0–0.1)
BASOPHILS NFR BLD AUTO: 0.8 %
BILIRUB SERPL-MCNC: 0.5 MG/DL (ref 0–1.2)
BUN SERPL-MCNC: 16 MG/DL (ref 6–23)
CALCIUM SERPL-MCNC: 9.7 MG/DL (ref 8.6–10.6)
CHLORIDE SERPL-SCNC: 106 MMOL/L (ref 98–107)
CHOLEST SERPL-MCNC: 172 MG/DL (ref 0–199)
CHOLESTEROL/HDL RATIO: 3.6
CO2 SERPL-SCNC: 23 MMOL/L (ref 21–32)
CREAT SERPL-MCNC: 0.97 MG/DL (ref 0.5–1.05)
EGFRCR SERPLBLD CKD-EPI 2021: 57 ML/MIN/1.73M*2
EOSINOPHIL # BLD AUTO: 0.12 X10*3/UL (ref 0–0.4)
EOSINOPHIL NFR BLD AUTO: 1.7 %
ERYTHROCYTE [DISTWIDTH] IN BLOOD BY AUTOMATED COUNT: 12.3 % (ref 11.5–14.5)
FERRITIN SERPL-MCNC: 97 NG/ML (ref 8–150)
GLUCOSE SERPL-MCNC: 103 MG/DL (ref 74–99)
HCT VFR BLD AUTO: 37.6 % (ref 36–46)
HDLC SERPL-MCNC: 47.5 MG/DL
HGB BLD-MCNC: 12.4 G/DL (ref 12–16)
IMM GRANULOCYTES # BLD AUTO: 0.05 X10*3/UL (ref 0–0.5)
IMM GRANULOCYTES NFR BLD AUTO: 0.7 % (ref 0–0.9)
IRON SERPL-MCNC: 78 UG/DL (ref 35–150)
LDLC SERPL CALC-MCNC: 106 MG/DL
LYMPHOCYTES # BLD AUTO: 2.71 X10*3/UL (ref 0.8–3)
LYMPHOCYTES NFR BLD AUTO: 37.5 %
MCH RBC QN AUTO: 28.4 PG (ref 26–34)
MCHC RBC AUTO-ENTMCNC: 33 G/DL (ref 32–36)
MCV RBC AUTO: 86 FL (ref 80–100)
MONOCYTES # BLD AUTO: 0.8 X10*3/UL (ref 0.05–0.8)
MONOCYTES NFR BLD AUTO: 11.1 %
NEUTROPHILS # BLD AUTO: 3.49 X10*3/UL (ref 1.6–5.5)
NEUTROPHILS NFR BLD AUTO: 48.2 %
NON HDL CHOLESTEROL: 125 MG/DL (ref 0–149)
NRBC BLD-RTO: 0 /100 WBCS (ref 0–0)
PLATELET # BLD AUTO: 330 X10*3/UL (ref 150–450)
POTASSIUM SERPL-SCNC: 4.8 MMOL/L (ref 3.5–5.3)
PROT SERPL-MCNC: 6.9 G/DL (ref 6.4–8.2)
RBC # BLD AUTO: 4.37 X10*6/UL (ref 4–5.2)
SODIUM SERPL-SCNC: 139 MMOL/L (ref 136–145)
T4 FREE SERPL-MCNC: 1.13 NG/DL (ref 0.78–1.48)
TRIGL SERPL-MCNC: 93 MG/DL (ref 0–149)
TSH SERPL-ACNC: 4.52 MIU/L (ref 0.44–3.98)
VLDL: 19 MG/DL (ref 0–40)
WBC # BLD AUTO: 7.2 X10*3/UL (ref 4.4–11.3)

## 2024-08-22 PROCEDURE — 82728 ASSAY OF FERRITIN: CPT

## 2024-08-22 PROCEDURE — 80061 LIPID PANEL: CPT

## 2024-08-22 PROCEDURE — 80053 COMPREHEN METABOLIC PANEL: CPT

## 2024-08-22 PROCEDURE — 83540 ASSAY OF IRON: CPT

## 2024-08-22 PROCEDURE — 84443 ASSAY THYROID STIM HORMONE: CPT

## 2024-08-22 PROCEDURE — 85025 COMPLETE CBC W/AUTO DIFF WBC: CPT

## 2024-08-22 PROCEDURE — 84439 ASSAY OF FREE THYROXINE: CPT

## 2024-08-22 PROCEDURE — 36415 COLL VENOUS BLD VENIPUNCTURE: CPT

## 2024-08-23 ENCOUNTER — TELEPHONE (OUTPATIENT)
Dept: PRIMARY CARE | Facility: CLINIC | Age: 84
End: 2024-08-23
Payer: COMMERCIAL

## 2024-08-23 ENCOUNTER — HOME HEALTH ADMISSION (OUTPATIENT)
Dept: HOME HEALTH SERVICES | Facility: HOME HEALTH | Age: 84
End: 2024-08-23
Payer: COMMERCIAL

## 2024-08-23 DIAGNOSIS — M54.16 LUMBAR RADICULOPATHY: Primary | ICD-10-CM

## 2024-08-23 DIAGNOSIS — R26.89 BALANCE DISORDER: ICD-10-CM

## 2024-08-23 DIAGNOSIS — R53.1 WEAKNESS GENERALIZED: ICD-10-CM

## 2024-08-23 DIAGNOSIS — R06.02 SOB (SHORTNESS OF BREATH): ICD-10-CM

## 2024-08-23 NOTE — TELEPHONE ENCOUNTER
Jaimie nurse manager with [employer] called requesting an order for home PT and an aid to help pt with  bathing

## 2024-08-24 ENCOUNTER — DOCUMENTATION (OUTPATIENT)
Dept: HOME HEALTH SERVICES | Facility: HOME HEALTH | Age: 84
End: 2024-08-24
Payer: COMMERCIAL

## 2024-08-24 NOTE — HH CARE COORDINATION
Home Care received a Referral for Physical Therapy and Home Health Aide. We have processed the referral for a Start of Care on 8/26/24.     If you have any questions or concerns, please feel free to contact us at [phone]. Follow the prompts, enter your five digit zip code, and you will be directed to your care team on [facility] 2.

## 2024-08-26 ENCOUNTER — HOME CARE VISIT (OUTPATIENT)
Dept: HOME HEALTH SERVICES | Facility: HOME HEALTH | Age: 84
End: 2024-08-26
Payer: COMMERCIAL

## 2024-08-26 PROCEDURE — G0151 HHCP-SERV OF PT,EA 15 MIN: HCPCS

## 2024-08-26 SDOH — ECONOMIC STABILITY: HOUSING INSECURITY: HOME SAFETY: FALL RISK

## 2024-08-26 ASSESSMENT — ENCOUNTER SYMPTOMS
PAIN LOCATION: BACK
MUSCLE WEAKNESS: 1
OCCASIONAL FEELINGS OF UNSTEADINESS: 1
PAIN: 1
PERSON REPORTING PAIN: PATIENT
LIMITED RANGE OF MOTION: 1

## 2024-08-26 ASSESSMENT — ACTIVITIES OF DAILY LIVING (ADL)
OASIS_M1830: 03
ENTERING_EXITING_HOME: MINIMUM ASSIST

## 2024-08-27 ENCOUNTER — TELEPHONE (OUTPATIENT)
Dept: PRIMARY CARE | Facility: CLINIC | Age: 84
End: 2024-08-27
Payer: COMMERCIAL

## 2024-08-27 NOTE — TELEPHONE ENCOUNTER
----- Message from Miladys Mcleod sent at 8/25/2024  3:40 PM EDT -----  Report to patient that her chemistries are normal except blood sugar just mildly elevated at 103.  Her cholesterol profile is mildly elevated with LDL/bad cholesterol at 106 and should be well under 100, she was in the 70s in the past.  Blood count is normal and iron level is normal, no anemia.

## 2024-08-28 ENCOUNTER — HOME CARE VISIT (OUTPATIENT)
Dept: HOME HEALTH SERVICES | Facility: HOME HEALTH | Age: 84
End: 2024-08-28
Payer: COMMERCIAL

## 2024-08-28 ENCOUNTER — PATIENT OUTREACH (OUTPATIENT)
Dept: PRIMARY CARE | Facility: CLINIC | Age: 84
End: 2024-08-28
Payer: COMMERCIAL

## 2024-09-03 ENCOUNTER — HOME CARE VISIT (OUTPATIENT)
Dept: HOME HEALTH SERVICES | Facility: HOME HEALTH | Age: 84
End: 2024-09-03
Payer: COMMERCIAL

## 2024-09-03 PROCEDURE — G0157 HHC PT ASSISTANT EA 15: HCPCS | Mod: CQ

## 2024-09-05 ENCOUNTER — HOME CARE VISIT (OUTPATIENT)
Dept: HOME HEALTH SERVICES | Facility: HOME HEALTH | Age: 84
End: 2024-09-05
Payer: COMMERCIAL

## 2024-09-05 VITALS — DIASTOLIC BLOOD PRESSURE: 62 MMHG | HEART RATE: 75 BPM | SYSTOLIC BLOOD PRESSURE: 143 MMHG

## 2024-09-05 PROCEDURE — G0157 HHC PT ASSISTANT EA 15: HCPCS | Mod: CQ

## 2024-09-05 NOTE — ED PROVIDER NOTES
Patient's repeat Chem-7 demonstrated resolution of the patient's hypokalemia and vast improvement of her hypocalcemia.  Urinalysis is unremarkable finally the, his CAT scan of the abdomen and pelvis demonstrated no significant acute abnormalities.    Given the patient's presentation, the patient will require admission to the hospital for further evaluation and therapy.  I spoke with the hospitalist who agreed the patient could be admitted to their service for further evaluation and therapy.  The patient was then admitted to the hospital in otherwise stable condition.     Keith Araujo MD  09/05/24 [id number]

## 2024-09-10 ENCOUNTER — HOME CARE VISIT (OUTPATIENT)
Dept: HOME HEALTH SERVICES | Facility: HOME HEALTH | Age: 84
End: 2024-09-10
Payer: COMMERCIAL

## 2024-09-10 PROCEDURE — G0157 HHC PT ASSISTANT EA 15: HCPCS | Mod: CQ

## 2024-09-11 VITALS — SYSTOLIC BLOOD PRESSURE: 165 MMHG | DIASTOLIC BLOOD PRESSURE: 76 MMHG

## 2024-09-12 ENCOUNTER — HOME CARE VISIT (OUTPATIENT)
Dept: HOME HEALTH SERVICES | Facility: HOME HEALTH | Age: 84
End: 2024-09-12
Payer: COMMERCIAL

## 2024-09-12 PROCEDURE — G0157 HHC PT ASSISTANT EA 15: HCPCS | Mod: CQ

## 2024-09-17 ENCOUNTER — HOME CARE VISIT (OUTPATIENT)
Dept: HOME HEALTH SERVICES | Facility: HOME HEALTH | Age: 84
End: 2024-09-17
Payer: COMMERCIAL

## 2024-09-17 VITALS — DIASTOLIC BLOOD PRESSURE: 68 MMHG | HEART RATE: 57 BPM | SYSTOLIC BLOOD PRESSURE: 209 MMHG

## 2024-09-17 PROCEDURE — G0157 HHC PT ASSISTANT EA 15: HCPCS | Mod: CQ

## 2024-09-18 ENCOUNTER — PATIENT OUTREACH (OUTPATIENT)
Dept: PRIMARY CARE | Facility: CLINIC | Age: 84
End: 2024-09-18
Payer: COMMERCIAL

## 2024-09-18 NOTE — PROGRESS NOTES
Call placed regarding one month post discharge follow up call. At time of outreach call the patient feels as if their condition has improved. She continues to be working with physical therapy at home. She states she is getting a little better each day. She reports she had a slight headache yesterday but it was nothing extreme. Pt denies questions or concerns at this time. Pt aware of my availability for non-emergent concerns. Contact info provided to patient

## 2024-09-19 ENCOUNTER — HOME CARE VISIT (OUTPATIENT)
Dept: HOME HEALTH SERVICES | Facility: HOME HEALTH | Age: 84
End: 2024-09-19
Payer: COMMERCIAL

## 2024-09-19 PROCEDURE — G0151 HHCP-SERV OF PT,EA 15 MIN: HCPCS

## 2024-09-19 ASSESSMENT — ENCOUNTER SYMPTOMS
PAIN: 1
PERSON REPORTING PAIN: PATIENT
OCCASIONAL FEELINGS OF UNSTEADINESS: 1

## 2024-09-24 ENCOUNTER — HOME CARE VISIT (OUTPATIENT)
Dept: HOME HEALTH SERVICES | Facility: HOME HEALTH | Age: 84
End: 2024-09-24
Payer: COMMERCIAL

## 2024-09-24 PROCEDURE — G0157 HHC PT ASSISTANT EA 15: HCPCS | Mod: CQ

## 2024-09-26 ENCOUNTER — HOME CARE VISIT (OUTPATIENT)
Dept: HOME HEALTH SERVICES | Facility: HOME HEALTH | Age: 84
End: 2024-09-26
Payer: COMMERCIAL

## 2024-09-26 PROCEDURE — G0157 HHC PT ASSISTANT EA 15: HCPCS | Mod: CQ

## 2024-09-30 ENCOUNTER — APPOINTMENT (OUTPATIENT)
Dept: PRIMARY CARE | Facility: CLINIC | Age: 84
End: 2024-09-30
Payer: COMMERCIAL

## 2024-09-30 VITALS
BODY MASS INDEX: 38.41 KG/M2 | OXYGEN SATURATION: 94 % | WEIGHT: 183 LBS | DIASTOLIC BLOOD PRESSURE: 75 MMHG | HEART RATE: 64 BPM | RESPIRATION RATE: 18 BRPM | HEIGHT: 58 IN | SYSTOLIC BLOOD PRESSURE: 153 MMHG | TEMPERATURE: 97.4 F

## 2024-09-30 DIAGNOSIS — I10 BENIGN ESSENTIAL HYPERTENSION: Primary | ICD-10-CM

## 2024-09-30 DIAGNOSIS — N18.31 CHRONIC KIDNEY DISEASE, STAGE 3A (MULTI): ICD-10-CM

## 2024-09-30 DIAGNOSIS — R19.7 DIARRHEA, UNSPECIFIED TYPE: ICD-10-CM

## 2024-09-30 DIAGNOSIS — R73.9 HYPERGLYCEMIA: ICD-10-CM

## 2024-09-30 DIAGNOSIS — F33.9 DEPRESSION, RECURRENT (CMS-HCC): ICD-10-CM

## 2024-09-30 DIAGNOSIS — E03.9 ACQUIRED HYPOTHYROIDISM: ICD-10-CM

## 2024-09-30 DIAGNOSIS — F32.A MILD DEPRESSION: ICD-10-CM

## 2024-09-30 DIAGNOSIS — Z23 ENCOUNTER FOR IMMUNIZATION: ICD-10-CM

## 2024-09-30 PROCEDURE — 1157F ADVNC CARE PLAN IN RCRD: CPT | Performed by: FAMILY MEDICINE

## 2024-09-30 PROCEDURE — 1123F ACP DISCUSS/DSCN MKR DOCD: CPT | Performed by: FAMILY MEDICINE

## 2024-09-30 PROCEDURE — 3078F DIAST BP <80 MM HG: CPT | Performed by: FAMILY MEDICINE

## 2024-09-30 PROCEDURE — 1160F RVW MEDS BY RX/DR IN RCRD: CPT | Performed by: FAMILY MEDICINE

## 2024-09-30 PROCEDURE — 1126F AMNT PAIN NOTED NONE PRSNT: CPT | Performed by: FAMILY MEDICINE

## 2024-09-30 PROCEDURE — 90662 IIV NO PRSV INCREASED AG IM: CPT | Performed by: FAMILY MEDICINE

## 2024-09-30 PROCEDURE — 1036F TOBACCO NON-USER: CPT | Performed by: FAMILY MEDICINE

## 2024-09-30 PROCEDURE — 3077F SYST BP >= 140 MM HG: CPT | Performed by: FAMILY MEDICINE

## 2024-09-30 PROCEDURE — 1159F MED LIST DOCD IN RCRD: CPT | Performed by: FAMILY MEDICINE

## 2024-09-30 PROCEDURE — G0008 ADMIN INFLUENZA VIRUS VAC: HCPCS | Performed by: FAMILY MEDICINE

## 2024-09-30 PROCEDURE — 99214 OFFICE O/P EST MOD 30 MIN: CPT | Performed by: FAMILY MEDICINE

## 2024-09-30 PROCEDURE — G2211 COMPLEX E/M VISIT ADD ON: HCPCS | Performed by: FAMILY MEDICINE

## 2024-09-30 RX ORDER — LOPERAMIDE HCL 2 MG
2 TABLET ORAL 2 TIMES DAILY PRN
Qty: 60 TABLET | Refills: 2 | Status: SHIPPED | OUTPATIENT
Start: 2024-09-30

## 2024-09-30 RX ORDER — VALSARTAN 80 MG/1
80 TABLET ORAL DAILY
Qty: 30 TABLET | Refills: 1 | Status: SHIPPED | OUTPATIENT
Start: 2024-09-30

## 2024-09-30 ASSESSMENT — PAIN SCALES - GENERAL: PAINLEVEL: 0-NO PAIN

## 2024-09-30 ASSESSMENT — PATIENT HEALTH QUESTIONNAIRE - PHQ9
6. FEELING BAD ABOUT YOURSELF - OR THAT YOU ARE A FAILURE OR HAVE LET YOURSELF OR YOUR FAMILY DOWN: NOT AT ALL
8. MOVING OR SPEAKING SO SLOWLY THAT OTHER PEOPLE COULD HAVE NOTICED. OR THE OPPOSITE, BEING SO FIGETY OR RESTLESS THAT YOU HAVE BEEN MOVING AROUND A LOT MORE THAN USUAL: SEVERAL DAYS
10. IF YOU CHECKED OFF ANY PROBLEMS, HOW DIFFICULT HAVE THESE PROBLEMS MADE IT FOR YOU TO DO YOUR WORK, TAKE CARE OF THINGS AT HOME, OR GET ALONG WITH OTHER PEOPLE: SOMEWHAT DIFFICULT
7. TROUBLE CONCENTRATING ON THINGS, SUCH AS READING THE NEWSPAPER OR WATCHING TELEVISION: MORE THAN HALF THE DAYS
4. FEELING TIRED OR HAVING LITTLE ENERGY: MORE THAN HALF THE DAYS
9. THOUGHTS THAT YOU WOULD BE BETTER OFF DEAD, OR OF HURTING YOURSELF: NOT AT ALL
SUM OF ALL RESPONSES TO PHQ9 QUESTIONS 1 AND 2: 6
5. POOR APPETITE OR OVEREATING: SEVERAL DAYS
1. LITTLE INTEREST OR PLEASURE IN DOING THINGS: NEARLY EVERY DAY
3. TROUBLE FALLING OR STAYING ASLEEP OR SLEEPING TOO MUCH: NOT AT ALL
2. FEELING DOWN, DEPRESSED OR HOPELESS: NEARLY EVERY DAY
SUM OF ALL RESPONSES TO PHQ QUESTIONS 1-9: 12

## 2024-09-30 ASSESSMENT — ANXIETY QUESTIONNAIRES
6. BECOMING EASILY ANNOYED OR IRRITABLE: MORE THAN HALF THE DAYS
IF YOU CHECKED OFF ANY PROBLEMS ON THIS QUESTIONNAIRE, HOW DIFFICULT HAVE THESE PROBLEMS MADE IT FOR YOU TO DO YOUR WORK, TAKE CARE OF THINGS AT HOME, OR GET ALONG WITH OTHER PEOPLE: SOMEWHAT DIFFICULT
2. NOT BEING ABLE TO STOP OR CONTROL WORRYING: SEVERAL DAYS
3. WORRYING TOO MUCH ABOUT DIFFERENT THINGS: SEVERAL DAYS
4. TROUBLE RELAXING: NOT AT ALL
GAD7 TOTAL SCORE: 6
5. BEING SO RESTLESS THAT IT IS HARD TO SIT STILL: NOT AT ALL
1. FEELING NERVOUS, ANXIOUS, OR ON EDGE: MORE THAN HALF THE DAYS
7. FEELING AFRAID AS IF SOMETHING AWFUL MIGHT HAPPEN: NOT AT ALL

## 2024-09-30 NOTE — PROGRESS NOTES
Subjective   Patient ID: Thania Christian is a 84 y.o. female who presents for 6 week f/up.    HPI   The patient presents to the clinic for a 6-week follow-up. She has past medical history of hypertension, hyperlipidemia, hypothyroidism, depression, ankle edema, lumbar radiculopathy, COPD, atopic dermatitis, and JENNA.    She is accompanied by her daughter in the clinic today.    The patient states that she has been anxious about her elevated BP readings recently. Her blood pressure (153/75) was elevated when checked in the clinic today. She has been monitoring her blood pressure at home frequently. She has brought a list of at-home BP readings and the majority of her BP readings have been elevated (~160-170/90). She has not brought her BP cuff into the clinic to check for accuracy. She continues on atenolol 50 mg (2 times daily), furosemide 20 mg daily, and losartan 50 mg daily for treatment of hypertension. She denies any side-effects to her anti-hypertensive medications. She denies any chest pain and/or SOB symptoms.    The patient reports severe daily episode of diarrhea in the afternoon (around 2 PM) after she started taking pantoprazole medication. Consequently, she discontinued use of pantoprazole medication (about 1.5 weeks ago). Since she discontinued use of pantoprazole medication, the patient may experience GERD symptoms ~1-2 times per week. Now, she continues to experience severe diarrhea episodes but intermittently. She wonders if her diarrhea symptoms are associated with losartan medication (started April 2024). Notably, she was experiencing mild intermittent diarrhea symptoms before starting on losartan medication and pantoprazole medication. The patient had not consulted with gastroenterology for treatment/evaluation of this condition thus far. She is scheduled for a gastroenterology appointment on 10/9 with an NP in [place] but family can not take her to the appt that day (wants recommendations for GI  "specialists that she may be able to see quickly and closer to home). She has been taking OTC imodium for treatment of diarrhea episodes.     The patient states that she had been taking a potassium chloride CR 10 mEq supplement previously. She discontinued use of this potassium chloride supplement ~1.5 weeks ago as her potassium level had returned to normal range (based on recent lab results).    The patient reports that she has meals prepared in her 55+ seniors community (current weight: 183 lbs, BMI: 38.25 kg/m2). Her daughter believes that these meals are unhealthy. Her daughter inquires if she (patient) should consider replacing one of these meals with a healthy nutritional drink (Ensure).    Review of Systems    Objective   /75   Pulse 64   Temp 36.3 °C (97.4 °F)   Resp 18   Ht 1.473 m (4' 10\")   Wt 83 kg (183 lb)   SpO2 94%   BMI 38.25 kg/m²     Physical Exam  Neck:      Vascular: No carotid bruit.   Cardiovascular:      Rate and Rhythm: Normal rate and regular rhythm.      Pulses: Normal pulses.      Heart sounds: Normal heart sounds.   Pulmonary:      Effort: Pulmonary effort is normal.      Breath sounds: Normal breath sounds.   Abdominal:      General: Bowel sounds are normal.      Palpations: Abdomen is soft. There is no mass.      Tenderness: There is no abdominal tenderness.   Musculoskeletal:         General: Normal range of motion.      Cervical back: Normal range of motion.      Right lower leg: No edema.      Left lower leg: No edema.   Skin:     General: Skin is warm and dry.   Neurological:      Mental Status: She is alert and oriented to person, place, and time.   Psychiatric:         Mood and Affect: Mood normal.         Thought Content: Thought content normal.         Judgment: Judgment normal.         Assessment/Plan   Problem List Items Addressed This Visit             ICD-10-CM    Acquired hypothyroidism E03.9    Benign essential hypertension - Primary I10    Relevant Medications "    valsartan (Diovan) 80 mg tablet    Chronic kidney disease, stage 3a (Multi) N18.31    Mild depression F32.A    Hyperglycemia R73.9    Depression, recurrent (CMS-HCC) F33.9     Pt has mild depression, in remission, compliant w medication          Other Visit Diagnoses         Codes    Diarrhea, unspecified type     R19.7    Relevant Medications    loperamide (Imodium A-D) 2 mg tablet    Encounter for immunization     Z23    Relevant Orders    Flu vaccine, trivalent, preservative free, HIGH-DOSE, age 65y+ (Fluzone) (Completed)               In regards to concerns with hypertension, since the patient had been suffering from diarrhea as a side-effect to losartan medication, she was advised to discontinue use of losartan 50 mg medication. Instead, the patient received a prescription for valsartan 80 mg medication. She was instructed to take 1 tablet (80 mg) of valsartan medication by mouth once daily. She was instructed to continue monitoring for potential side-effects to valsartan medication. Continue taking furosemide medication and atenolol medication as previously directed. Continue monitoring blood pressure at home occassionally (not daily so anxiety is controlled) and bring a list of at-home BP readings to the clinic during her follow-up appointment in 4-6 weeks.    In regards to concerns with intermittent severe diarrhea episodes, the patient was provided with some recommendations for local gastroenterologists (Dr. Marie, Dr. Greene) in the area so that she could possibly have an evaluation. In addition, since her losartan medication has been discontinued (replaced with valsartan medication), her diarrhea symptoms may improve. Continue monitoring symptoms for improvement/exacerbation.    In regards to concerns with GERD (1-2 instances per week), the patient was advised to try OTC famotidine for treatment of this condition. Continue monitoring symptoms for improvement/exacerbation.  Will leave pt off PPI for  now    In regards to her dietary concerns, the patient was advised on healthy options at her 55+ community. She was also advised that she should consider Ensure high protein drinks as a meal replacement or something similar for ~1 unhealthy meal. Continue monitoring weight at home regularly.    The patient received her annual flu vaccine in the clinic today.    She will follow-up in 4-6 weeks, unless otherwise needed.  Recheck bp and will bring in bp cuff and readings    Scribe Attestation  By signing my name below, I, Alisson Michael , Guillermoibchata   attest that this documentation has been prepared under the direction and in the presence of Miladys Mcleod DO.

## 2024-10-01 ENCOUNTER — HOME CARE VISIT (OUTPATIENT)
Dept: HOME HEALTH SERVICES | Facility: HOME HEALTH | Age: 84
End: 2024-10-01
Payer: COMMERCIAL

## 2024-10-01 VITALS — DIASTOLIC BLOOD PRESSURE: 73 MMHG | HEART RATE: 64 BPM | SYSTOLIC BLOOD PRESSURE: 163 MMHG

## 2024-10-01 PROCEDURE — G0157 HHC PT ASSISTANT EA 15: HCPCS | Mod: CQ

## 2024-10-03 ENCOUNTER — HOME CARE VISIT (OUTPATIENT)
Dept: HOME HEALTH SERVICES | Facility: HOME HEALTH | Age: 84
End: 2024-10-03
Payer: COMMERCIAL

## 2024-10-03 VITALS — SYSTOLIC BLOOD PRESSURE: 169 MMHG | HEART RATE: 59 BPM | DIASTOLIC BLOOD PRESSURE: 67 MMHG

## 2024-10-03 PROCEDURE — G0157 HHC PT ASSISTANT EA 15: HCPCS | Mod: CQ

## 2024-10-08 ENCOUNTER — HOME CARE VISIT (OUTPATIENT)
Dept: HOME HEALTH SERVICES | Facility: HOME HEALTH | Age: 84
End: 2024-10-08
Payer: COMMERCIAL

## 2024-10-08 VITALS — SYSTOLIC BLOOD PRESSURE: 143 MMHG | DIASTOLIC BLOOD PRESSURE: 62 MMHG | HEART RATE: 63 BPM

## 2024-10-08 PROCEDURE — G0157 HHC PT ASSISTANT EA 15: HCPCS | Mod: CQ

## 2024-10-09 ENCOUNTER — APPOINTMENT (OUTPATIENT)
Dept: GASTROENTEROLOGY | Facility: CLINIC | Age: 84
End: 2024-10-09
Payer: COMMERCIAL

## 2024-10-10 ENCOUNTER — HOME CARE VISIT (OUTPATIENT)
Dept: HOME HEALTH SERVICES | Facility: HOME HEALTH | Age: 84
End: 2024-10-10
Payer: COMMERCIAL

## 2024-10-10 PROCEDURE — G0157 HHC PT ASSISTANT EA 15: HCPCS | Mod: CQ

## 2024-10-15 ENCOUNTER — HOME CARE VISIT (OUTPATIENT)
Dept: HOME HEALTH SERVICES | Facility: HOME HEALTH | Age: 84
End: 2024-10-15
Payer: COMMERCIAL

## 2024-10-15 VITALS — DIASTOLIC BLOOD PRESSURE: 71 MMHG | HEART RATE: 56 BPM | SYSTOLIC BLOOD PRESSURE: 130 MMHG

## 2024-10-15 PROCEDURE — G0157 HHC PT ASSISTANT EA 15: HCPCS | Mod: CQ

## 2024-10-17 ENCOUNTER — HOME CARE VISIT (OUTPATIENT)
Dept: HOME HEALTH SERVICES | Facility: HOME HEALTH | Age: 84
End: 2024-10-17
Payer: COMMERCIAL

## 2024-10-17 PROCEDURE — G0157 HHC PT ASSISTANT EA 15: HCPCS | Mod: CQ

## 2024-10-20 ENCOUNTER — HOME CARE VISIT (OUTPATIENT)
Dept: HOME HEALTH SERVICES | Facility: HOME HEALTH | Age: 84
End: 2024-10-20
Payer: COMMERCIAL

## 2024-10-21 ASSESSMENT — ACTIVITIES OF DAILY LIVING (ADL)
OASIS_M1830: 01
HOME_HEALTH_OASIS: 00

## 2024-10-25 ENCOUNTER — APPOINTMENT (OUTPATIENT)
Dept: PRIMARY CARE | Facility: CLINIC | Age: 84
End: 2024-10-25
Payer: COMMERCIAL

## 2024-10-26 DIAGNOSIS — I10 BENIGN ESSENTIAL HYPERTENSION: ICD-10-CM

## 2024-11-04 ENCOUNTER — APPOINTMENT (OUTPATIENT)
Dept: PRIMARY CARE | Facility: CLINIC | Age: 84
End: 2024-11-04
Payer: COMMERCIAL

## 2024-11-04 VITALS
SYSTOLIC BLOOD PRESSURE: 128 MMHG | RESPIRATION RATE: 18 BRPM | TEMPERATURE: 98.1 F | HEIGHT: 58 IN | DIASTOLIC BLOOD PRESSURE: 70 MMHG | BODY MASS INDEX: 38.2 KG/M2 | OXYGEN SATURATION: 94 % | HEART RATE: 63 BPM | WEIGHT: 182 LBS

## 2024-11-04 DIAGNOSIS — E78.5 HYPERLIPIDEMIA, UNSPECIFIED HYPERLIPIDEMIA TYPE: ICD-10-CM

## 2024-11-04 DIAGNOSIS — R73.9 HYPERGLYCEMIA: ICD-10-CM

## 2024-11-04 DIAGNOSIS — G62.9 NEUROPATHY: Primary | ICD-10-CM

## 2024-11-04 DIAGNOSIS — M54.16 LUMBAR RADICULOPATHY: ICD-10-CM

## 2024-11-04 DIAGNOSIS — Z79.899 LONG TERM USE OF DRUG: ICD-10-CM

## 2024-11-04 DIAGNOSIS — I10 BENIGN ESSENTIAL HYPERTENSION: ICD-10-CM

## 2024-11-04 DIAGNOSIS — F32.A MILD DEPRESSION: ICD-10-CM

## 2024-11-04 LAB
AMPHETAMINES UR QL SCN: NORMAL
BARBITURATES UR QL SCN: NORMAL
BENZODIAZ UR QL SCN: NORMAL
BZE UR QL SCN: NORMAL
CANNABINOIDS UR QL SCN: NORMAL
FENTANYL+NORFENTANYL UR QL SCN: NORMAL
METHADONE UR QL SCN: NORMAL
OPIATES UR QL SCN: NORMAL
OXYCODONE+OXYMORPHONE UR QL SCN: NORMAL
PCP UR QL SCN: NORMAL

## 2024-11-04 PROCEDURE — 3074F SYST BP LT 130 MM HG: CPT | Performed by: FAMILY MEDICINE

## 2024-11-04 PROCEDURE — 1160F RVW MEDS BY RX/DR IN RCRD: CPT | Performed by: FAMILY MEDICINE

## 2024-11-04 PROCEDURE — 80355 GABAPENTIN NON-BLOOD: CPT

## 2024-11-04 PROCEDURE — 1123F ACP DISCUSS/DSCN MKR DOCD: CPT | Performed by: FAMILY MEDICINE

## 2024-11-04 PROCEDURE — G2211 COMPLEX E/M VISIT ADD ON: HCPCS | Performed by: FAMILY MEDICINE

## 2024-11-04 PROCEDURE — 99214 OFFICE O/P EST MOD 30 MIN: CPT | Performed by: FAMILY MEDICINE

## 2024-11-04 PROCEDURE — 1157F ADVNC CARE PLAN IN RCRD: CPT | Performed by: FAMILY MEDICINE

## 2024-11-04 PROCEDURE — 1159F MED LIST DOCD IN RCRD: CPT | Performed by: FAMILY MEDICINE

## 2024-11-04 PROCEDURE — 80307 DRUG TEST PRSMV CHEM ANLYZR: CPT

## 2024-11-04 PROCEDURE — 3078F DIAST BP <80 MM HG: CPT | Performed by: FAMILY MEDICINE

## 2024-11-04 PROCEDURE — 1125F AMNT PAIN NOTED PAIN PRSNT: CPT | Performed by: FAMILY MEDICINE

## 2024-11-04 RX ORDER — GABAPENTIN 100 MG/1
200 CAPSULE ORAL 2 TIMES DAILY
Qty: 360 CAPSULE | Refills: 0 | Status: SHIPPED | OUTPATIENT
Start: 2024-11-04 | End: 2025-02-02

## 2024-11-04 RX ORDER — ATENOLOL 50 MG/1
75 TABLET ORAL 2 TIMES DAILY
Qty: 270 TABLET | Refills: 0 | Status: SHIPPED | OUTPATIENT
Start: 2024-11-04 | End: 2025-02-02

## 2024-11-04 RX ORDER — ESCITALOPRAM OXALATE 10 MG/1
10 TABLET ORAL DAILY
Qty: 90 TABLET | Refills: 0 | Status: SHIPPED | OUTPATIENT
Start: 2024-11-04 | End: 2025-02-02

## 2024-11-04 RX ORDER — FUROSEMIDE 20 MG/1
20 TABLET ORAL DAILY
Qty: 90 TABLET | Refills: 0 | Status: SHIPPED | OUTPATIENT
Start: 2024-11-04 | End: 2025-02-02

## 2024-11-04 ASSESSMENT — PAIN SCALES - GENERAL: PAINLEVEL_OUTOF10: 4

## 2024-11-04 NOTE — PROGRESS NOTES
"Subjective   Patient ID: Thania Christian is a 84 y.o. female who presents for 4-6 week f/up.    HPI   The patient presents to the clinic for a ~4-6 week follow-up. She has past medical history of hypertension, hyperlipidemia, hypothyroidism, depression, ankle edema, lumbar radiculopathy, COPD, atopic dermatitis, and JENNA.    She is accompanied by her daughter in the clinic today.    She continues on gabapentin 100 mg (1 tablet in AM and 1 tablet in PM) for treatment of back pain (lumbar radiculopathy). She states that her back pain has not been controlled at this dose of gabapentin medication. She denies any side-effects to her gabapentin medication. She inquires if she can increase the dose of her gabapentin medication to control her pain symptoms.  Was prescribed 3 per day but never went up, will increase.    Her blood pressure (128/70) was within good range when checked in the clinic today. She monitors her BP at home frequently. She has brought her BP cuff into the clinic today to check for accuracy. She continues on atenolol 50 mg (2 times daily), furosemide 20 mg daily, and valsartan 80 mg daily for treatment of hypertension. She denies any side-effects to her atenolol medication and/or furosemide medication. However, she has been experiencing diarrhea/cramping symptoms as a side-effect to valsartan medication (patient similarly experienced GI side-effects to losartan medication previously). She denies any chest pain and/or SOB symptoms.    She is on Lexapro 10 mg daily for treatment of depression/anxiety. Her mood has been good on Lexapro medication. Her depression/anxiety symptoms have been controlled. She denies any side-effects to her Lexapro medication.    Bp is controlled, no orthostatic s/s.  Denies cp    Tolerating statin, due for labs in 3months    Review of Systems    Objective   /70   Pulse 63   Temp 36.7 °C (98.1 °F)   Resp 18   Ht 1.473 m (4' 10\")   Wt 82.6 kg (182 lb)   SpO2 94%   BMI " 38.04 kg/m²     Physical Exam  Neck:      Vascular: No carotid bruit.   Cardiovascular:      Rate and Rhythm: Normal rate and regular rhythm.      Pulses: Normal pulses.      Heart sounds: Normal heart sounds.   Pulmonary:      Effort: Pulmonary effort is normal.      Breath sounds: Normal breath sounds.   Musculoskeletal:         General: Normal range of motion.      Cervical back: Normal range of motion.      Right lower leg: No edema.      Left lower leg: No edema.   Skin:     General: Skin is warm and dry.   Neurological:      Mental Status: She is alert and oriented to person, place, and time.      Gait: Gait abnormal.   Psychiatric:         Mood and Affect: Mood normal.         Thought Content: Thought content normal.         Judgment: Judgment normal.         Assessment/Plan   Problem List Items Addressed This Visit             ICD-10-CM    Benign essential hypertension I10    Relevant Medications    atenolol (Tenormin) 50 mg tablet    furosemide (Lasix) 20 mg tablet    Lumbar radiculopathy M54.16    Relevant Medications    gabapentin (Neurontin) 100 mg capsule    Mild depression F32.A    Relevant Medications    escitalopram (Lexapro) 10 mg tablet     Other Visit Diagnoses         Codes    Neuropathy    -  Primary G62.9    Relevant Medications    gabapentin (Neurontin) 100 mg capsule    Other Relevant Orders    Gabapentin,Urine    Long term use of drug     Z79.899    Relevant Orders    Drug Screen, Urine With Reflex to Confirmation (Completed)    Hyperlipidemia, unspecified hyperlipidemia type     E78.5               In regards to concerns with back pain (lumbar radiculopathy), the patient was advised that she can increase the dose of her gabapentin medication to better control her back pain symptoms. Consequently, she was advised to initially increase to 2 tablets of gabapentin 100 mg in the morning and 1 tablet of gabapentin 100 mg in the evening for the next 3 days. After 3 days, if she is able to tolerate  this dose of gabapentin medication well, then she can increase to 2 tablets of gabapentin 100 mg in the morning and 2 tablets of gabapentin 100 mg in the evening. Continue monitoring for potential side-effects to gabapentin medication. OARRS were reviewed. Risk to benefit ratio was assessed. A UDS/CSA was conducted on the patient in the clinic today. Continue monitoring symptoms for improvement/exacerbation.  Discussed can go higher on dose since she is on relatively low dose, she will call if needs to increase further and will reassess at her next 3 month appt.    In regards to her hypertension, the patient was informed that her BP cuff was inaccurate (higher BP readings due to too small cuff upper arm ). She was advised to purchase a new cuff for her BP monitor and she instructed on proper method to measure BP at home (using her own cuff). Since the patient has been suffering from cramping/diarrhea as a side-effect to valsartan medication, she was instructed to discontinue use of valsartan medication. Alternatively, she received a dose adjustment to her atenlol medication (from 50 mg 2 times daily to 75 mg 2 times daily). She was instructed to take 1.5 tablets (75 mg) of atenolol by mouth 2 times daily for treatment of hypertension. She was advised to continue taking furosemide 20 mg daily as previously directed. Continue monitoring blood pressure at home (with new BP cuff) for improvement/exacerbation.  She will call in bp's next 2-4 weeks    In regards to her depression/anxiety, the patient received a refill for her Lexapro 10 mg medication. She was instructed to continue taking Lexapro medication as previously directed. Continue monitoring symptoms.    She will follow-up in 3 months, unless otherwise needed.  Labs prior    Scribe Attestation  By signing my name below, I, Alisson Michael , Scribe   attest that this documentation has been prepared under the direction and in the presence of Miladys Mcleod,  .

## 2024-11-07 ENCOUNTER — PATIENT OUTREACH (OUTPATIENT)
Dept: PRIMARY CARE | Facility: CLINIC | Age: 84
End: 2024-11-07
Payer: COMMERCIAL

## 2024-11-07 LAB — GABAPENTIN UR-MCNC: 253.7 UG/ML

## 2024-11-15 RX ORDER — VALSARTAN 80 MG/1
80 TABLET ORAL DAILY
Qty: 90 TABLET | Refills: 1 | OUTPATIENT
Start: 2024-11-15

## 2025-01-29 DIAGNOSIS — I10 BENIGN ESSENTIAL HYPERTENSION: ICD-10-CM

## 2025-02-03 RX ORDER — ATENOLOL 50 MG/1
75 TABLET ORAL 2 TIMES DAILY
Qty: 90 TABLET | Refills: 0 | Status: SHIPPED | OUTPATIENT
Start: 2025-02-03

## 2025-02-03 NOTE — TELEPHONE ENCOUNTER
Follow-up visit: 2/10/25 valorie Ma  Last visit: 11/4/24  Last labs: 8/22/24 (eGFR 57, Cr 0.97)  Last vitals: 11/4/24 (/70, HR 63)    Will send 1 month supply of atenolol 50mg - 1.5 tab BID until follow-up

## 2025-02-04 LAB
ALBUMIN SERPL-MCNC: 3.8 G/DL (ref 3.6–5.1)
ALP SERPL-CCNC: 46 U/L (ref 37–153)
ALT SERPL-CCNC: 14 U/L (ref 6–29)
ANION GAP SERPL CALCULATED.4IONS-SCNC: 8 MMOL/L (CALC) (ref 7–17)
AST SERPL-CCNC: 17 U/L (ref 10–35)
BASOPHILS # BLD AUTO: 51 CELLS/UL (ref 0–200)
BASOPHILS NFR BLD AUTO: 0.9 %
BILIRUB SERPL-MCNC: 0.7 MG/DL (ref 0.2–1.2)
BUN SERPL-MCNC: 19 MG/DL (ref 7–25)
CALCIUM SERPL-MCNC: 9.6 MG/DL (ref 8.6–10.4)
CHLORIDE SERPL-SCNC: 107 MMOL/L (ref 98–110)
CHOLEST SERPL-MCNC: 194 MG/DL
CHOLEST/HDLC SERPL: 3.9 (CALC)
CO2 SERPL-SCNC: 27 MMOL/L (ref 20–32)
CREAT SERPL-MCNC: 0.95 MG/DL (ref 0.6–0.95)
EGFRCR SERPLBLD CKD-EPI 2021: 59 ML/MIN/1.73M2
EOSINOPHIL # BLD AUTO: 143 CELLS/UL (ref 15–500)
EOSINOPHIL NFR BLD AUTO: 2.5 %
ERYTHROCYTE [DISTWIDTH] IN BLOOD BY AUTOMATED COUNT: 12.6 % (ref 11–15)
EST. AVERAGE GLUCOSE BLD GHB EST-MCNC: 123 MG/DL
EST. AVERAGE GLUCOSE BLD GHB EST-SCNC: 6.8 MMOL/L
GLUCOSE SERPL-MCNC: 87 MG/DL (ref 65–99)
HBA1C MFR BLD: 5.9 % OF TOTAL HGB
HCT VFR BLD AUTO: 41.3 % (ref 35–45)
HDLC SERPL-MCNC: 50 MG/DL
HGB BLD-MCNC: 13.3 G/DL (ref 11.7–15.5)
LDLC SERPL CALC-MCNC: 116 MG/DL (CALC)
LYMPHOCYTES # BLD AUTO: 2006 CELLS/UL (ref 850–3900)
LYMPHOCYTES NFR BLD AUTO: 35.2 %
MCH RBC QN AUTO: 28.9 PG (ref 27–33)
MCHC RBC AUTO-ENTMCNC: 32.2 G/DL (ref 32–36)
MCV RBC AUTO: 89.6 FL (ref 80–100)
MONOCYTES # BLD AUTO: 553 CELLS/UL (ref 200–950)
MONOCYTES NFR BLD AUTO: 9.7 %
NEUTROPHILS # BLD AUTO: 2947 CELLS/UL (ref 1500–7800)
NEUTROPHILS NFR BLD AUTO: 51.7 %
NONHDLC SERPL-MCNC: 144 MG/DL (CALC)
PLATELET # BLD AUTO: 239 THOUSAND/UL (ref 140–400)
PMV BLD REES-ECKER: 11.3 FL (ref 7.5–12.5)
POTASSIUM SERPL-SCNC: 5 MMOL/L (ref 3.5–5.3)
PROT SERPL-MCNC: 6.7 G/DL (ref 6.1–8.1)
RBC # BLD AUTO: 4.61 MILLION/UL (ref 3.8–5.1)
SODIUM SERPL-SCNC: 142 MMOL/L (ref 135–146)
TRIGL SERPL-MCNC: 168 MG/DL
WBC # BLD AUTO: 5.7 THOUSAND/UL (ref 3.8–10.8)

## 2025-02-10 ENCOUNTER — APPOINTMENT (OUTPATIENT)
Dept: PRIMARY CARE | Facility: CLINIC | Age: 85
End: 2025-02-10
Payer: MEDICARE

## 2025-02-10 ENCOUNTER — APPOINTMENT (OUTPATIENT)
Dept: PRIMARY CARE | Facility: CLINIC | Age: 85
End: 2025-02-10
Payer: COMMERCIAL

## 2025-02-14 ENCOUNTER — OFFICE VISIT (OUTPATIENT)
Dept: PRIMARY CARE | Facility: CLINIC | Age: 85
End: 2025-02-14
Payer: MEDICARE

## 2025-02-14 VITALS
TEMPERATURE: 98 F | OXYGEN SATURATION: 91 % | DIASTOLIC BLOOD PRESSURE: 72 MMHG | SYSTOLIC BLOOD PRESSURE: 138 MMHG | HEART RATE: 76 BPM | BODY MASS INDEX: 38.25 KG/M2 | WEIGHT: 183 LBS

## 2025-02-14 DIAGNOSIS — E78.5 HYPERLIPIDEMIA, UNSPECIFIED HYPERLIPIDEMIA TYPE: ICD-10-CM

## 2025-02-14 DIAGNOSIS — G62.9 NEUROPATHY: ICD-10-CM

## 2025-02-14 DIAGNOSIS — I10 BENIGN ESSENTIAL HYPERTENSION: Primary | ICD-10-CM

## 2025-02-14 DIAGNOSIS — N18.31 CHRONIC KIDNEY DISEASE, STAGE 3A (MULTI): ICD-10-CM

## 2025-02-14 DIAGNOSIS — M54.16 LUMBAR RADICULOPATHY: ICD-10-CM

## 2025-02-14 DIAGNOSIS — R73.03 PREDIABETES: ICD-10-CM

## 2025-02-14 DIAGNOSIS — F32.A MILD DEPRESSION: ICD-10-CM

## 2025-02-14 DIAGNOSIS — R19.7 DIARRHEA, UNSPECIFIED TYPE: ICD-10-CM

## 2025-02-14 DIAGNOSIS — R19.5 POSITIVE COLORECTAL CANCER SCREENING USING COLOGUARD TEST: ICD-10-CM

## 2025-02-14 DIAGNOSIS — Z12.11 COLON CANCER SCREENING: ICD-10-CM

## 2025-02-14 PROCEDURE — 1123F ACP DISCUSS/DSCN MKR DOCD: CPT | Performed by: FAMILY MEDICINE

## 2025-02-14 PROCEDURE — 99214 OFFICE O/P EST MOD 30 MIN: CPT | Performed by: FAMILY MEDICINE

## 2025-02-14 PROCEDURE — 3078F DIAST BP <80 MM HG: CPT | Performed by: FAMILY MEDICINE

## 2025-02-14 PROCEDURE — 3075F SYST BP GE 130 - 139MM HG: CPT | Performed by: FAMILY MEDICINE

## 2025-02-14 PROCEDURE — 1126F AMNT PAIN NOTED NONE PRSNT: CPT | Performed by: FAMILY MEDICINE

## 2025-02-14 PROCEDURE — 1159F MED LIST DOCD IN RCRD: CPT | Performed by: FAMILY MEDICINE

## 2025-02-14 PROCEDURE — G2211 COMPLEX E/M VISIT ADD ON: HCPCS | Performed by: FAMILY MEDICINE

## 2025-02-14 RX ORDER — ROSUVASTATIN CALCIUM 20 MG/1
20 TABLET, COATED ORAL DAILY
Qty: 100 TABLET | Refills: 0 | Status: SHIPPED | OUTPATIENT
Start: 2025-02-14

## 2025-02-14 RX ORDER — LOPERAMIDE HCL 2 MG
2 TABLET ORAL 2 TIMES DAILY PRN
Qty: 60 TABLET | Refills: 2 | Status: SHIPPED | OUTPATIENT
Start: 2025-02-14

## 2025-02-14 RX ORDER — GABAPENTIN 100 MG/1
200 CAPSULE ORAL 2 TIMES DAILY
Qty: 400 CAPSULE | Refills: 0 | Status: SHIPPED | OUTPATIENT
Start: 2025-02-14

## 2025-02-14 RX ORDER — ESCITALOPRAM OXALATE 10 MG/1
10 TABLET ORAL DAILY
Qty: 100 TABLET | Refills: 0 | Status: SHIPPED | OUTPATIENT
Start: 2025-02-14

## 2025-02-14 RX ORDER — FUROSEMIDE 20 MG/1
20 TABLET ORAL DAILY
Qty: 100 TABLET | Refills: 0 | Status: SHIPPED | OUTPATIENT
Start: 2025-02-14

## 2025-02-14 ASSESSMENT — PAIN SCALES - GENERAL: PAINLEVEL_OUTOF10: 0-NO PAIN

## 2025-02-14 NOTE — PATIENT INSTRUCTIONS
Reviewed lab results.  Increased Rosuvastatin (goal LDL <100 per PCP).  Refilled other medications at current doses.  Referred to nutritionist for prediabetes.  Referred to gastroenterology for further evaluation of chronic diarrhea.  Referred for colonoscopy for colon cancer screening with positive cologuard.    Hypertension recommendations (Goal BP <130/80 due to chronic kidney disease):  DASH diet.  Decrease sodium intake to <1,500 mg/day.  Recommend weight loss efforts (see www.yourweightmatters.org/category/nutrition for ideas).  Recommend exercising (brisk walking, jogging, swimming, bicycling) 30 minutes/day, 5 days/week.  Stop smoking (if applicable).  Call Tobacco Quit Line (3-220-QUIT-NOW) for resources and support.  Decrease alcohol intake (if applicable).     F/U w/PCP.

## 2025-02-14 NOTE — PROGRESS NOTES
Subjective   Patient ID: Thania Christian is a 84 y.o. female who presents for Follow-up (3 MONTH FOLLOW UP /).  Daughter present    HPI   PCP: Dr. Lowery    Has Depression, HLD, HTN, Lumbar radiculopathy, Neuropathy.  Taking med(s) as directed.  Requests refills (except Atenolol).     Has diarrhea (once/wk) x 15-20 yrs.  Taking Imodium daily x 10 yrs (provided by PCP).  States she saw a specialist 7 yrs ago in [place] (records not available), and they thought it was due to her nerves.  No current GI follow up here.  Had positive cologuard last year, did not schedule colonoscopy as directed (referral ).    Labs obtained prior to visit (ordered by PCP).    Review of Systems  No other complaints.     Objective   /72   Pulse 76   Temp 36.7 °C (98 °F) (Temporal)   Wt 83 kg (183 lb)   SpO2 91%   BMI 38.25 kg/m²     Physical Exam  Constitutional:       General: She is not in acute distress.     Appearance: She is obese.   Cardiovascular:      Rate and Rhythm: Normal rate and regular rhythm.      Heart sounds: Normal heart sounds. No murmur heard.     No friction rub. No gallop.   Pulmonary:      Effort: Pulmonary effort is normal.      Breath sounds: Normal breath sounds. No wheezing, rhonchi or rales.   Musculoskeletal:      Comments: Ambulating w/Rollator   Neurological:      Mental Status: She is oriented to person, place, and time.   Psychiatric:         Mood and Affect: Mood normal.         Behavior: Behavior normal.     Assessment/Plan   Diagnoses and all orders for this visit:  Benign essential hypertension  -     furosemide (Lasix) 20 mg tablet; Take 1 tablet (20 mg) by mouth once daily.  Hyperlipidemia, unspecified hyperlipidemia type  -     rosuvastatin (Crestor) 20 mg tablet; Take 1 tablet (20 mg) by mouth once daily.  Neuropathy  -     gabapentin (Neurontin) 100 mg capsule; Take 2 capsules (200 mg) by mouth 2 times a day.  Lumbar radiculopathy  -     gabapentin (Neurontin) 100 mg capsule;  Take 2 capsules (200 mg) by mouth 2 times a day.  Mild depression  -     escitalopram (Lexapro) 10 mg tablet; Take 1 tablet (10 mg) by mouth once daily.  Diarrhea, unspecified type  -     loperamide (Imodium A-D) 2 mg tablet; Take 1 tablet (2 mg) by mouth 2 times a day as needed for diarrhea.  -     Referral to Gastroenterology; Future  Positive colorectal cancer screening using Cologuard test  -     Colonoscopy Screening; Average Risk Patient; Future  Colon cancer screening  -     Colonoscopy Screening; Average Risk Patient; Future  Prediabetes  -     Referral to Nutrition Services; Future  Chronic kidney disease, stage 3a (Multi)    Reviewed lab results.  Increased Rosuvastatin (goal LDL <100 per PCP).  Refilled other medications at current doses.  Continue Atenolol as directed.  Referred to nutritionist for prediabetes.  Referred to gastroenterology for further evaluation of chronic diarrhea.  Referred for colonoscopy for colon cancer screening with positive cologuard.    Hypertension recommendations (Goal BP <130/80 due to chronic kidney disease):  DASH diet.  Decrease sodium intake to <1,500 mg/day.  Recommend weight loss efforts (see www.yourweightmatters.org/category/nutrition for ideas).  Recommend exercising (brisk walking, jogging, swimming, bicycling) 30 minutes/day, 5 days/week.  Stop smoking (if applicable).  Call Tobacco Quit Line (7-475-QUIT-NOW) for resources and support.  Decrease alcohol intake (if applicable).     F/U w/PCP.

## 2025-02-16 DIAGNOSIS — I10 BENIGN ESSENTIAL HYPERTENSION: ICD-10-CM

## 2025-02-16 DIAGNOSIS — F32.A MILD DEPRESSION: ICD-10-CM

## 2025-02-27 DIAGNOSIS — I10 BENIGN ESSENTIAL HYPERTENSION: ICD-10-CM

## 2025-03-03 RX ORDER — ESCITALOPRAM OXALATE 10 MG/1
10 TABLET ORAL DAILY
Qty: 90 TABLET | Refills: 1 | OUTPATIENT
Start: 2025-03-03

## 2025-03-03 RX ORDER — FUROSEMIDE 20 MG/1
20 TABLET ORAL DAILY
Qty: 90 TABLET | Refills: 1 | OUTPATIENT
Start: 2025-03-03

## 2025-03-03 RX ORDER — ATENOLOL 50 MG/1
75 TABLET ORAL 2 TIMES DAILY
Qty: 270 TABLET | Refills: 0 | Status: SHIPPED | OUTPATIENT
Start: 2025-03-03

## 2025-03-03 NOTE — TELEPHONE ENCOUNTER
Follow-up visit: 5/5/25  Last visit: 2/14/25 valorie Aquino  Last labs: 2/3/25 (eGFR 59, Cr 0.95, CrCl 39 ml/min)  Last vitals: 2/14/25 (/70, 138/72  HR 76)    Noted high dosage. Kidney function is at baseline and BP is stable. Will send 90 day supply of atenolol 75mg BID to [facility] pharmacy until follow-up.

## 2025-05-05 ENCOUNTER — APPOINTMENT (OUTPATIENT)
Dept: PRIMARY CARE | Facility: CLINIC | Age: 85
End: 2025-05-05
Payer: MEDICARE

## 2025-05-05 VITALS
HEIGHT: 58 IN | TEMPERATURE: 98 F | BODY MASS INDEX: 37.99 KG/M2 | SYSTOLIC BLOOD PRESSURE: 138 MMHG | OXYGEN SATURATION: 94 % | DIASTOLIC BLOOD PRESSURE: 73 MMHG | WEIGHT: 181 LBS | HEART RATE: 71 BPM

## 2025-05-05 DIAGNOSIS — J44.9 CHRONIC OBSTRUCTIVE PULMONARY DISEASE, UNSPECIFIED COPD TYPE (MULTI): ICD-10-CM

## 2025-05-05 DIAGNOSIS — K52.9 CHRONIC DIARRHEA: ICD-10-CM

## 2025-05-05 DIAGNOSIS — E78.5 HYPERLIPIDEMIA, UNSPECIFIED HYPERLIPIDEMIA TYPE: ICD-10-CM

## 2025-05-05 DIAGNOSIS — G62.9 NEUROPATHY: ICD-10-CM

## 2025-05-05 DIAGNOSIS — R73.9 HYPERGLYCEMIA: ICD-10-CM

## 2025-05-05 DIAGNOSIS — B35.1 NAIL FUNGUS: ICD-10-CM

## 2025-05-05 DIAGNOSIS — E66.01 OBESITY, MORBID (MULTI): ICD-10-CM

## 2025-05-05 DIAGNOSIS — I10 BENIGN ESSENTIAL HYPERTENSION: ICD-10-CM

## 2025-05-05 DIAGNOSIS — F32.A MILD DEPRESSION: ICD-10-CM

## 2025-05-05 DIAGNOSIS — Z00.00 MEDICARE ANNUAL WELLNESS VISIT, SUBSEQUENT: Primary | ICD-10-CM

## 2025-05-05 DIAGNOSIS — R19.7 DIARRHEA, UNSPECIFIED TYPE: ICD-10-CM

## 2025-05-05 DIAGNOSIS — M54.16 LUMBAR RADICULOPATHY: ICD-10-CM

## 2025-05-05 DIAGNOSIS — Z00.00 ROUTINE GENERAL MEDICAL EXAMINATION AT HEALTH CARE FACILITY: ICD-10-CM

## 2025-05-05 PROCEDURE — 99214 OFFICE O/P EST MOD 30 MIN: CPT | Performed by: FAMILY MEDICINE

## 2025-05-05 PROCEDURE — 1123F ACP DISCUSS/DSCN MKR DOCD: CPT | Performed by: FAMILY MEDICINE

## 2025-05-05 PROCEDURE — 1159F MED LIST DOCD IN RCRD: CPT | Performed by: FAMILY MEDICINE

## 2025-05-05 PROCEDURE — 1036F TOBACCO NON-USER: CPT | Performed by: FAMILY MEDICINE

## 2025-05-05 PROCEDURE — 3078F DIAST BP <80 MM HG: CPT | Performed by: FAMILY MEDICINE

## 2025-05-05 PROCEDURE — 99397 PER PM REEVAL EST PAT 65+ YR: CPT | Performed by: FAMILY MEDICINE

## 2025-05-05 PROCEDURE — G0439 PPPS, SUBSEQ VISIT: HCPCS | Performed by: FAMILY MEDICINE

## 2025-05-05 PROCEDURE — 3075F SYST BP GE 130 - 139MM HG: CPT | Performed by: FAMILY MEDICINE

## 2025-05-05 PROCEDURE — 1170F FXNL STATUS ASSESSED: CPT | Performed by: FAMILY MEDICINE

## 2025-05-05 PROCEDURE — 1160F RVW MEDS BY RX/DR IN RCRD: CPT | Performed by: FAMILY MEDICINE

## 2025-05-05 RX ORDER — FUROSEMIDE 20 MG/1
20 TABLET ORAL DAILY
Qty: 90 TABLET | Refills: 1 | Status: SHIPPED | OUTPATIENT
Start: 2025-05-05

## 2025-05-05 RX ORDER — ROSUVASTATIN CALCIUM 20 MG/1
20 TABLET, COATED ORAL DAILY
Qty: 90 TABLET | Refills: 1 | Status: SHIPPED | OUTPATIENT
Start: 2025-05-05

## 2025-05-05 RX ORDER — ATENOLOL 50 MG/1
75 TABLET ORAL 2 TIMES DAILY
Qty: 270 TABLET | Refills: 0 | Status: SHIPPED | OUTPATIENT
Start: 2025-05-05

## 2025-05-05 RX ORDER — ESCITALOPRAM OXALATE 10 MG/1
10 TABLET ORAL DAILY
Qty: 90 TABLET | Refills: 1 | Status: SHIPPED | OUTPATIENT
Start: 2025-05-05

## 2025-05-05 RX ORDER — LOPERAMIDE HCL 2 MG
2 TABLET ORAL 2 TIMES DAILY PRN
Qty: 180 TABLET | Refills: 0 | Status: SHIPPED | OUTPATIENT
Start: 2025-05-05

## 2025-05-05 RX ORDER — GABAPENTIN 100 MG/1
CAPSULE ORAL
Qty: 450 CAPSULE | Refills: 0 | Status: SHIPPED | OUTPATIENT
Start: 2025-05-05

## 2025-05-05 ASSESSMENT — ACTIVITIES OF DAILY LIVING (ADL)
GROCERY_SHOPPING: INDEPENDENT
BATHING: INDEPENDENT
DRESSING: INDEPENDENT
DOING_HOUSEWORK: NEEDS ASSISTANCE
MANAGING_FINANCES: INDEPENDENT
TAKING_MEDICATION: INDEPENDENT

## 2025-05-05 ASSESSMENT — ANXIETY QUESTIONNAIRES
6. BECOMING EASILY ANNOYED OR IRRITABLE: SEVERAL DAYS
4. TROUBLE RELAXING: SEVERAL DAYS
5. BEING SO RESTLESS THAT IT IS HARD TO SIT STILL: MORE THAN HALF THE DAYS
2. NOT BEING ABLE TO STOP OR CONTROL WORRYING: SEVERAL DAYS
7. FEELING AFRAID AS IF SOMETHING AWFUL MIGHT HAPPEN: SEVERAL DAYS
GAD7 TOTAL SCORE: 9
IF YOU CHECKED OFF ANY PROBLEMS ON THIS QUESTIONNAIRE, HOW DIFFICULT HAVE THESE PROBLEMS MADE IT FOR YOU TO DO YOUR WORK, TAKE CARE OF THINGS AT HOME, OR GET ALONG WITH OTHER PEOPLE: NOT DIFFICULT AT ALL
3. WORRYING TOO MUCH ABOUT DIFFERENT THINGS: SEVERAL DAYS
1. FEELING NERVOUS, ANXIOUS, OR ON EDGE: MORE THAN HALF THE DAYS

## 2025-05-05 ASSESSMENT — PATIENT HEALTH QUESTIONNAIRE - PHQ9
SUM OF ALL RESPONSES TO PHQ9 QUESTIONS 1 AND 2: 2
1. LITTLE INTEREST OR PLEASURE IN DOING THINGS: SEVERAL DAYS
9. THOUGHTS THAT YOU WOULD BE BETTER OFF DEAD, OR OF HURTING YOURSELF: NOT AT ALL
SUM OF ALL RESPONSES TO PHQ QUESTIONS 1-9: 13
10. IF YOU CHECKED OFF ANY PROBLEMS, HOW DIFFICULT HAVE THESE PROBLEMS MADE IT FOR YOU TO DO YOUR WORK, TAKE CARE OF THINGS AT HOME, OR GET ALONG WITH OTHER PEOPLE: NOT DIFFICULT AT ALL
3. TROUBLE FALLING OR STAYING ASLEEP OR SLEEPING TOO MUCH: MORE THAN HALF THE DAYS
6. FEELING BAD ABOUT YOURSELF - OR THAT YOU ARE A FAILURE OR HAVE LET YOURSELF OR YOUR FAMILY DOWN: SEVERAL DAYS
4. FEELING TIRED OR HAVING LITTLE ENERGY: NEARLY EVERY DAY
7. TROUBLE CONCENTRATING ON THINGS, SUCH AS READING THE NEWSPAPER OR WATCHING TELEVISION: MORE THAN HALF THE DAYS
2. FEELING DOWN, DEPRESSED OR HOPELESS: SEVERAL DAYS
8. MOVING OR SPEAKING SO SLOWLY THAT OTHER PEOPLE COULD HAVE NOTICED. OR THE OPPOSITE, BEING SO FIGETY OR RESTLESS THAT YOU HAVE BEEN MOVING AROUND A LOT MORE THAN USUAL: SEVERAL DAYS
5. POOR APPETITE OR OVEREATING: MORE THAN HALF THE DAYS

## 2025-05-05 ASSESSMENT — ENCOUNTER SYMPTOMS
DEPRESSION: 0
OCCASIONAL FEELINGS OF UNSTEADINESS: 1
LOSS OF SENSATION IN FEET: 0

## 2025-05-05 NOTE — PROGRESS NOTES
Subjective   Reason for Visit: Thania Christian is an 85 y.o. female here for a Medicare Wellness visit.     Past Medical, Surgical, and Family History reviewed and updated in chart.    Reviewed all medications by prescribing practitioner or clinical pharmacist (such as prescriptions, OTCs, herbal therapies and supplements) and documented in the medical record.  Medicare Wellness Billing Compliance Satisfied    *This is a visual tool to show completion of required items on the day of the visit. Green checks will only appear on the date of visit.    Review all medications by prescribing practitioner or clinical pharmacist (such as prescriptions, OTCs, herbal therapies and supplements) documented in the medical record    Past Medical, Surgical, and Family History reviewed and updated in chart    Tobacco Use Reviewed    Alcohol Use Reviewed    Illicit Drug Use Reviewed    PHQ2/9    Falls in Last Year Reviewed    Home Safety Risk Factors Reviewed    Cognitive Impairment Reviewed    Patient Self Assessment and Health Status    Current Diet Reviewed    Exercise Frequency    ADL - Hearing Impairment    ADL - Bathing    ADL - Dressing    ADL - Walks in Home    IADL - Managing Finances    IADL - Grocery Shopping    IADL - Taking Medications    IADL - Doing Housework      HPI  The patient presents to the clinic for a 6 month follow-up and MCW. She has past medical history of hypertension, hyperlipidemia, hypothyroidism, depression, ankle edema, lumbar radiculopathy, COPD, atopic dermatitis, and JENNA.   Her daughter is here with her today    She is taking her medications as instructed. She does need refills of them. She would like to increase her gabapentin as well. She is on 400 mg per day.  She states she wakes up at around 3 am and often pain comes back and would like to try taking 300 mg gabapentin at bedtime and continue on 200 mg in the morning since has no side effects from this dose and works well to  "control s/s    She is on rosuvastatin, 20 mg, and would like that refilled as well. She has had significant improvement in her cholesterol on this med and no side effects to medication    She reports that about 6 weeks ago, she started to develop a reaction after having a manicure done. Redness around her nails, it is much improved but she may have fungus of both her nails on her thumbs.  She had artificial nails on and were removed.  She owuld like ot see derm for this.    She had 6 inches of her colon removed around 2015. She has a hx of non-cancerous polyps.     Her blood pressure measured at 138/73 in clinic today. She reports that her blood pressure at home is a little higher than this at times, but generally well controlled.     Patient Care Team:  Miladys Mcleod DO as PCP - General     Review of Systems    Objective   Vitals:  /73 (BP Location: Left arm, Patient Position: Sitting, BP Cuff Size: Adult)   Pulse 71   Temp 36.7 °C (98 °F) (Temporal)   Ht 1.473 m (4' 10\")   Wt 82.1 kg (181 lb)   SpO2 94%   BMI 37.83 kg/m²       Physical Exam  HENT:      Head: Normocephalic.      Right Ear: Tympanic membrane normal.      Left Ear: Tympanic membrane normal.      Mouth/Throat:      Pharynx: Oropharynx is clear.   Neck:      Vascular: No carotid bruit.   Cardiovascular:      Rate and Rhythm: Normal rate and regular rhythm.      Pulses: Normal pulses.      Heart sounds: Normal heart sounds.   Pulmonary:      Effort: Pulmonary effort is normal.      Breath sounds: Normal breath sounds.   Abdominal:      General: Bowel sounds are normal.      Palpations: Abdomen is soft. There is no mass.      Tenderness: There is no abdominal tenderness.   Musculoskeletal:         General: Normal range of motion.      Cervical back: Normal range of motion.      Right lower leg: No edema.      Left lower leg: No edema.   Skin:     General: Skin is warm and dry.   Neurological:      Mental Status: She is alert and " oriented to person, place, and time.      Gait: Gait abnormal.      Comments: Walking w cane   Psychiatric:         Mood and Affect: Mood normal.         Thought Content: Thought content normal.         Judgment: Judgment normal.         Assessment & Plan  Medicare annual wellness visit, subsequent         Routine general medical examination at health care facility         Hyperlipidemia, unspecified hyperlipidemia type    Orders:    rosuvastatin (Crestor) 20 mg tablet; Take 1 tablet (20 mg) by mouth once daily.    Comprehensive Metabolic Panel; Future    Lipid Panel; Future    Neuropathy    Orders:    gabapentin (Neurontin) 100 mg capsule; TAKE 2 CAPS IN THE MORNING AND 3 AT BEDTIME    Lumbar radiculopathy    Orders:    gabapentin (Neurontin) 100 mg capsule; TAKE 2 CAPS IN THE MORNING AND 3 AT BEDTIME    Benign essential hypertension    Orders:    furosemide (Lasix) 20 mg tablet; Take 1 tablet (20 mg) by mouth once daily.    atenolol (Tenormin) 50 mg tablet; Take 1.5 tablets (75 mg) by mouth 2 times a day.    CBC and Auto Differential; Future    Mild depression    Orders:    escitalopram (Lexapro) 10 mg tablet; Take 1 tablet (10 mg) by mouth once daily.    Diarrhea, unspecified type    Orders:    loperamide (Imodium A-D) 2 mg tablet; Take 1 tablet (2 mg) by mouth 2 times a day as needed for diarrhea.    Hyperglycemia    Orders:    Hemoglobin A1C; Future    Nail fungus    Orders:    Referral to Dermatology    Chronic diarrhea    Orders:    Referral to Gastroenterology; Future       Refilled medications (gabapentin 100 mg, rosuvastatin 20 mg, furosemide 20 mg, escitalopram 10 mg, atenolol 50 mg, loperamide 2 mg) today. She will take as instructed.     Take 2 in the morning and 3 at night of the gabapentin, 100 mg.     Will do a urine drug screen at her next follow up.     Labs (lipid, CBC, A1C, and CMP) ordered and to be done prior to follow up.     Gastro referral. Will need colonoscopy.  She would like to get  colonoscopy since diarrhea still a problem.  Will have her see GI for consult first and then schedule and she is willing to do this    Referral to dermatology.  Has been seen at [facility] in the past    Reviewed vaccines, utd    Declines mamm and dexa        Follow up in 3 months with blood work done prior.       Scribe Attestation  By signing my name below, ILeonie, Teri   attest that this documentation has been prepared under the direction and in the presence of Miladys Mcleod DO.

## 2025-05-05 NOTE — ASSESSMENT & PLAN NOTE
Pt is watching portion size, has difficulty getting in activity due to joint pain.  Discussed chair exercises

## 2025-05-05 NOTE — ASSESSMENT & PLAN NOTE
Orders:    furosemide (Lasix) 20 mg tablet; Take 1 tablet (20 mg) by mouth once daily.    atenolol (Tenormin) 50 mg tablet; Take 1.5 tablets (75 mg) by mouth 2 times a day.    CBC and Auto Differential; Future

## 2025-05-07 ENCOUNTER — APPOINTMENT (OUTPATIENT)
Dept: OPHTHALMOLOGY | Facility: CLINIC | Age: 85
End: 2025-05-07
Payer: COMMERCIAL

## 2025-08-02 DIAGNOSIS — R19.7 DIARRHEA, UNSPECIFIED TYPE: ICD-10-CM

## 2025-08-05 DIAGNOSIS — R73.9 HYPERGLYCEMIA: ICD-10-CM

## 2025-08-05 DIAGNOSIS — I10 BENIGN ESSENTIAL HYPERTENSION: ICD-10-CM

## 2025-08-05 DIAGNOSIS — E78.5 HYPERLIPIDEMIA, UNSPECIFIED HYPERLIPIDEMIA TYPE: ICD-10-CM

## 2025-08-05 LAB
ALBUMIN SERPL-MCNC: 3.7 G/DL (ref 3.6–5.1)
ALP SERPL-CCNC: 54 U/L (ref 37–153)
ALT SERPL-CCNC: 16 U/L (ref 6–29)
ANION GAP SERPL CALCULATED.4IONS-SCNC: 10 MMOL/L (CALC) (ref 7–17)
AST SERPL-CCNC: 19 U/L (ref 10–35)
BASOPHILS # BLD AUTO: 39 CELLS/UL (ref 0–200)
BASOPHILS NFR BLD AUTO: 0.6 %
BILIRUB SERPL-MCNC: 0.7 MG/DL (ref 0.2–1.2)
BUN SERPL-MCNC: 17 MG/DL (ref 7–25)
CALCIUM SERPL-MCNC: 9.2 MG/DL (ref 8.6–10.4)
CHLORIDE SERPL-SCNC: 107 MMOL/L (ref 98–110)
CHOLEST SERPL-MCNC: 162 MG/DL
CHOLEST/HDLC SERPL: 3.2 (CALC)
CO2 SERPL-SCNC: 25 MMOL/L (ref 20–32)
CREAT SERPL-MCNC: 0.91 MG/DL (ref 0.6–0.95)
EGFRCR SERPLBLD CKD-EPI 2021: 61 ML/MIN/1.73M2
EOSINOPHIL # BLD AUTO: 143 CELLS/UL (ref 15–500)
EOSINOPHIL NFR BLD AUTO: 2.2 %
ERYTHROCYTE [DISTWIDTH] IN BLOOD BY AUTOMATED COUNT: 13.1 % (ref 11–15)
EST. AVERAGE GLUCOSE BLD GHB EST-MCNC: 120 MG/DL
EST. AVERAGE GLUCOSE BLD GHB EST-SCNC: 6.6 MMOL/L
GLUCOSE SERPL-MCNC: 104 MG/DL (ref 65–99)
HBA1C MFR BLD: 5.8 %
HCT VFR BLD AUTO: 41.1 % (ref 35–45)
HDLC SERPL-MCNC: 50 MG/DL
HGB BLD-MCNC: 13.2 G/DL (ref 11.7–15.5)
LDLC SERPL CALC-MCNC: 92 MG/DL (CALC)
LYMPHOCYTES # BLD AUTO: 2184 CELLS/UL (ref 850–3900)
LYMPHOCYTES NFR BLD AUTO: 33.6 %
MCH RBC QN AUTO: 28.6 PG (ref 27–33)
MCHC RBC AUTO-ENTMCNC: 32.1 G/DL (ref 32–36)
MCV RBC AUTO: 89.2 FL (ref 80–100)
MONOCYTES # BLD AUTO: 774 CELLS/UL (ref 200–950)
MONOCYTES NFR BLD AUTO: 11.9 %
NEUTROPHILS # BLD AUTO: 3361 CELLS/UL (ref 1500–7800)
NEUTROPHILS NFR BLD AUTO: 51.7 %
NONHDLC SERPL-MCNC: 112 MG/DL (CALC)
PLATELET # BLD AUTO: 242 THOUSAND/UL (ref 140–400)
PMV BLD REES-ECKER: 11.5 FL (ref 7.5–12.5)
POTASSIUM SERPL-SCNC: 4.6 MMOL/L (ref 3.5–5.3)
PROT SERPL-MCNC: 6.5 G/DL (ref 6.1–8.1)
RBC # BLD AUTO: 4.61 MILLION/UL (ref 3.8–5.1)
SODIUM SERPL-SCNC: 142 MMOL/L (ref 135–146)
TRIGL SERPL-MCNC: 104 MG/DL
WBC # BLD AUTO: 6.5 THOUSAND/UL (ref 3.8–10.8)

## 2025-08-07 ENCOUNTER — APPOINTMENT (OUTPATIENT)
Dept: PRIMARY CARE | Facility: CLINIC | Age: 85
End: 2025-08-07
Payer: MEDICARE

## 2025-08-18 ENCOUNTER — APPOINTMENT (OUTPATIENT)
Dept: PRIMARY CARE | Facility: CLINIC | Age: 85
End: 2025-08-18
Payer: MEDICARE

## 2025-08-18 VITALS
BODY MASS INDEX: 37.83 KG/M2 | DIASTOLIC BLOOD PRESSURE: 74 MMHG | WEIGHT: 181 LBS | TEMPERATURE: 98 F | OXYGEN SATURATION: 94 % | HEART RATE: 68 BPM | SYSTOLIC BLOOD PRESSURE: 128 MMHG

## 2025-08-18 DIAGNOSIS — R73.03 PREDIABETES: ICD-10-CM

## 2025-08-18 DIAGNOSIS — E78.5 HYPERLIPIDEMIA, UNSPECIFIED HYPERLIPIDEMIA TYPE: Primary | ICD-10-CM

## 2025-08-18 DIAGNOSIS — G62.9 NEUROPATHY: ICD-10-CM

## 2025-08-18 DIAGNOSIS — I10 BENIGN ESSENTIAL HYPERTENSION: ICD-10-CM

## 2025-08-18 DIAGNOSIS — F32.A MILD DEPRESSION: ICD-10-CM

## 2025-08-18 DIAGNOSIS — M54.16 LUMBAR RADICULOPATHY: ICD-10-CM

## 2025-08-18 PROCEDURE — 1159F MED LIST DOCD IN RCRD: CPT | Performed by: FAMILY MEDICINE

## 2025-08-18 PROCEDURE — 1036F TOBACCO NON-USER: CPT | Performed by: FAMILY MEDICINE

## 2025-08-18 PROCEDURE — 3074F SYST BP LT 130 MM HG: CPT | Performed by: FAMILY MEDICINE

## 2025-08-18 PROCEDURE — G2211 COMPLEX E/M VISIT ADD ON: HCPCS | Performed by: FAMILY MEDICINE

## 2025-08-18 PROCEDURE — 3078F DIAST BP <80 MM HG: CPT | Performed by: FAMILY MEDICINE

## 2025-08-18 PROCEDURE — 1160F RVW MEDS BY RX/DR IN RCRD: CPT | Performed by: FAMILY MEDICINE

## 2025-08-18 PROCEDURE — 99214 OFFICE O/P EST MOD 30 MIN: CPT | Performed by: FAMILY MEDICINE

## 2025-08-18 RX ORDER — GABAPENTIN 100 MG/1
CAPSULE ORAL
Qty: 450 CAPSULE | Refills: 0 | Status: SHIPPED | OUTPATIENT
Start: 2025-08-18

## 2025-08-18 RX ORDER — FUROSEMIDE 20 MG/1
20 TABLET ORAL DAILY
Qty: 90 TABLET | Refills: 0 | Status: SHIPPED | OUTPATIENT
Start: 2025-08-18

## 2025-08-18 RX ORDER — ESCITALOPRAM OXALATE 10 MG/1
10 TABLET ORAL DAILY
Qty: 90 TABLET | Refills: 0 | Status: SHIPPED | OUTPATIENT
Start: 2025-08-18

## 2025-08-18 RX ORDER — ATENOLOL 50 MG/1
75 TABLET ORAL 2 TIMES DAILY
Qty: 270 TABLET | Refills: 0 | Status: SHIPPED | OUTPATIENT
Start: 2025-08-18

## 2025-08-18 RX ORDER — ROSUVASTATIN CALCIUM 20 MG/1
20 TABLET, COATED ORAL DAILY
Qty: 90 TABLET | Refills: 0 | Status: SHIPPED | OUTPATIENT
Start: 2025-08-18

## 2025-08-18 ASSESSMENT — ANXIETY QUESTIONNAIRES
IF YOU CHECKED OFF ANY PROBLEMS ON THIS QUESTIONNAIRE, HOW DIFFICULT HAVE THESE PROBLEMS MADE IT FOR YOU TO DO YOUR WORK, TAKE CARE OF THINGS AT HOME, OR GET ALONG WITH OTHER PEOPLE: NOT DIFFICULT AT ALL
6. BECOMING EASILY ANNOYED OR IRRITABLE: MORE THAN HALF THE DAYS
4. TROUBLE RELAXING: NOT AT ALL
7. FEELING AFRAID AS IF SOMETHING AWFUL MIGHT HAPPEN: SEVERAL DAYS
2. NOT BEING ABLE TO STOP OR CONTROL WORRYING: SEVERAL DAYS
GAD7 TOTAL SCORE: 7
3. WORRYING TOO MUCH ABOUT DIFFERENT THINGS: SEVERAL DAYS
5. BEING SO RESTLESS THAT IT IS HARD TO SIT STILL: SEVERAL DAYS
1. FEELING NERVOUS, ANXIOUS, OR ON EDGE: SEVERAL DAYS

## 2025-08-18 ASSESSMENT — PATIENT HEALTH QUESTIONNAIRE - PHQ9
1. LITTLE INTEREST OR PLEASURE IN DOING THINGS: SEVERAL DAYS
8. MOVING OR SPEAKING SO SLOWLY THAT OTHER PEOPLE COULD HAVE NOTICED. OR THE OPPOSITE, BEING SO FIGETY OR RESTLESS THAT YOU HAVE BEEN MOVING AROUND A LOT MORE THAN USUAL: SEVERAL DAYS
SUM OF ALL RESPONSES TO PHQ9 QUESTIONS 1 AND 2: 2
SUM OF ALL RESPONSES TO PHQ QUESTIONS 1-9: 10
5. POOR APPETITE OR OVEREATING: MORE THAN HALF THE DAYS
2. FEELING DOWN, DEPRESSED OR HOPELESS: SEVERAL DAYS
7. TROUBLE CONCENTRATING ON THINGS, SUCH AS READING THE NEWSPAPER OR WATCHING TELEVISION: NOT AT ALL
3. TROUBLE FALLING OR STAYING ASLEEP OR SLEEPING TOO MUCH: MORE THAN HALF THE DAYS
9. THOUGHTS THAT YOU WOULD BE BETTER OFF DEAD, OR OF HURTING YOURSELF: NOT AT ALL
4. FEELING TIRED OR HAVING LITTLE ENERGY: MORE THAN HALF THE DAYS
6. FEELING BAD ABOUT YOURSELF - OR THAT YOU ARE A FAILURE OR HAVE LET YOURSELF OR YOUR FAMILY DOWN: SEVERAL DAYS

## 2025-08-21 RX ORDER — LOPERAMIDE HYDROCHLORIDE 2 MG/1
CAPSULE ORAL
Qty: 60 CAPSULE | Refills: 2 | OUTPATIENT
Start: 2025-08-21

## 2025-12-08 ENCOUNTER — APPOINTMENT (OUTPATIENT)
Dept: PRIMARY CARE | Facility: CLINIC | Age: 85
End: 2025-12-08
Payer: MEDICARE